# Patient Record
Sex: FEMALE | Race: WHITE | Employment: OTHER | URBAN - METROPOLITAN AREA
[De-identification: names, ages, dates, MRNs, and addresses within clinical notes are randomized per-mention and may not be internally consistent; named-entity substitution may affect disease eponyms.]

---

## 2017-09-05 PROBLEM — G89.29 CHRONIC BILATERAL LOW BACK PAIN WITHOUT SCIATICA: Status: ACTIVE | Noted: 2017-09-05

## 2017-09-05 PROBLEM — M54.50 CHRONIC BILATERAL LOW BACK PAIN WITHOUT SCIATICA: Status: ACTIVE | Noted: 2017-09-05

## 2018-05-18 PROBLEM — H26.491 OTHER SECONDARY CATARACT, RIGHT EYE: Status: ACTIVE | Noted: 2017-06-07

## 2018-07-18 PROBLEM — Z79.02 PLATELET INHIBITION DUE TO PLAVIX: Status: ACTIVE | Noted: 2018-07-18

## 2018-08-15 PROCEDURE — 88305 TISSUE EXAM BY PATHOLOGIST: CPT | Performed by: INTERNAL MEDICINE

## 2020-08-14 ENCOUNTER — HOSPITAL ENCOUNTER (OUTPATIENT)
Dept: MAMMOGRAPHY | Age: 75
Discharge: HOME | End: 2020-08-14
Attending: FAMILY MEDICINE
Payer: MEDICARE

## 2020-08-14 DIAGNOSIS — Z12.31 VISIT FOR SCREENING MAMMOGRAM: ICD-10-CM

## 2020-08-14 PROCEDURE — 77063 BREAST TOMOSYNTHESIS BI: CPT | Performed by: FAMILY MEDICINE

## 2020-08-14 PROCEDURE — 77067 SCR MAMMO BI INCL CAD: CPT | Performed by: FAMILY MEDICINE

## 2020-08-14 NOTE — PROGRESS NOTES
[employer] message sent to patient that mammogram results is normal and to follow up yearly for breast exam and repeat mammogram.      [phone] (home) [phone] (work)  Telephone Information:  Mobile          [phone]

## 2021-02-10 PROBLEM — L57.0 AK (ACTINIC KERATOSIS): Status: ACTIVE | Noted: 2021-02-10

## 2021-02-10 PROBLEM — C44.319 BASAL CELL CARCINOMA (BCC) OF LEFT FOREHEAD: Status: ACTIVE | Noted: 2021-02-10

## 2021-02-25 ENCOUNTER — APPOINTMENT (OUTPATIENT)
Dept: CT IMAGING | Facility: HOSPITAL | Age: 76
DRG: 065 | End: 2021-02-25
Attending: EMERGENCY MEDICINE
Payer: MEDICARE

## 2021-02-25 ENCOUNTER — HOSPITAL ENCOUNTER (INPATIENT)
Facility: HOSPITAL | Age: 76
LOS: 1 days | Discharge: HOME | DRG: 065 | End: 2021-02-27
Attending: EMERGENCY MEDICINE | Admitting: INTERNAL MEDICINE
Payer: MEDICARE

## 2021-02-25 ENCOUNTER — APPOINTMENT (OUTPATIENT)
Dept: GENERAL RADIOLOGY | Facility: HOSPITAL | Age: 76
DRG: 065 | End: 2021-02-25
Attending: EMERGENCY MEDICINE
Payer: MEDICARE

## 2021-02-25 DIAGNOSIS — R47.01 APHASIA: Primary | ICD-10-CM

## 2021-02-25 DIAGNOSIS — Z86.73 HISTORY OF CVA (CEREBROVASCULAR ACCIDENT) WITHOUT RESIDUAL DEFICITS: ICD-10-CM

## 2021-02-25 PROBLEM — R79.89 AZOTEMIA: Status: ACTIVE | Noted: 2021-02-25

## 2021-02-25 LAB
ALBUMIN SERPL-MCNC: 3.6 G/DL (ref 3.4–5)
ALBUMIN/GLOB SERPL: 1 {RATIO} (ref 1–2)
ALP LIVER SERPL-CCNC: 94 U/L
ALT SERPL-CCNC: 33 U/L
ANION GAP SERPL CALC-SCNC: 5 MMOL/L (ref 0–18)
AST SERPL-CCNC: 23 U/L (ref 15–37)
BASOPHILS # BLD AUTO: 0.02 X10(3) UL (ref 0–0.2)
BASOPHILS NFR BLD AUTO: 0.3 %
BILIRUB SERPL-MCNC: 0.3 MG/DL (ref 0.1–2)
BUN BLD-MCNC: 16 MG/DL (ref 7–18)
BUN/CREAT SERPL: 22.5 (ref 10–20)
CALCIUM BLD-MCNC: 9.3 MG/DL (ref 8.5–10.1)
CHLORIDE SERPL-SCNC: 107 MMOL/L (ref 98–112)
CHOLEST SMN-MCNC: 231 MG/DL (ref ?–200)
CO2 SERPL-SCNC: 26 MMOL/L (ref 21–32)
CREAT BLD-MCNC: 0.71 MG/DL
DEPRECATED RDW RBC AUTO: 49.6 FL (ref 35.1–46.3)
EOSINOPHIL # BLD AUTO: 0.02 X10(3) UL (ref 0–0.7)
EOSINOPHIL NFR BLD AUTO: 0.3 %
ERYTHROCYTE [DISTWIDTH] IN BLOOD BY AUTOMATED COUNT: 13.5 % (ref 11–15)
EST. AVERAGE GLUCOSE BLD GHB EST-MCNC: 111 MG/DL (ref 68–126)
GLOBULIN PLAS-MCNC: 3.6 G/DL (ref 2.8–4.4)
GLUCOSE BLD-MCNC: 88 MG/DL (ref 70–99)
GLUCOSE BLD-MCNC: 92 MG/DL (ref 70–99)
HBA1C MFR BLD HPLC: 5.5 % (ref ?–5.7)
HCT VFR BLD AUTO: 36.6 %
HDLC SERPL-MCNC: 133 MG/DL (ref 40–59)
HGB BLD-MCNC: 12.1 G/DL
IMM GRANULOCYTES # BLD AUTO: 0.01 X10(3) UL (ref 0–1)
IMM GRANULOCYTES NFR BLD: 0.1 %
LDLC SERPL CALC-MCNC: 87 MG/DL (ref ?–100)
LYMPHOCYTES # BLD AUTO: 2.16 X10(3) UL (ref 1–4)
LYMPHOCYTES NFR BLD AUTO: 28.4 %
M PROTEIN MFR SERPL ELPH: 7.2 G/DL (ref 6.4–8.2)
MCH RBC QN AUTO: 32.7 PG (ref 26–34)
MCHC RBC AUTO-ENTMCNC: 33.1 G/DL (ref 31–37)
MCV RBC AUTO: 98.9 FL
MONOCYTES # BLD AUTO: 0.82 X10(3) UL (ref 0.1–1)
MONOCYTES NFR BLD AUTO: 10.8 %
NEUTROPHILS # BLD AUTO: 4.57 X10 (3) UL (ref 1.5–7.7)
NEUTROPHILS # BLD AUTO: 4.57 X10(3) UL (ref 1.5–7.7)
NEUTROPHILS NFR BLD AUTO: 60.1 %
NONHDLC SERPL-MCNC: 98 MG/DL (ref ?–130)
NT-PROBNP SERPL-MCNC: 70 PG/ML (ref ?–450)
OSMOLALITY SERPL CALC.SUM OF ELEC: 287 MOSM/KG (ref 275–295)
PLATELET # BLD AUTO: 302 10(3)UL (ref 150–450)
POTASSIUM SERPL-SCNC: 4.1 MMOL/L (ref 3.5–5.1)
RBC # BLD AUTO: 3.7 X10(6)UL
SARS-COV-2 RNA RESP QL NAA+PROBE: NOT DETECTED
SODIUM SERPL-SCNC: 138 MMOL/L (ref 136–145)
TRIGL SERPL-MCNC: 56 MG/DL (ref 30–149)
TROPONIN I SERPL-MCNC: <0.045 NG/ML (ref ?–0.04)
VLDLC SERPL CALC-MCNC: 11 MG/DL (ref 0–30)
WBC # BLD AUTO: 7.6 X10(3) UL (ref 4–11)

## 2021-02-25 PROCEDURE — 70450 CT HEAD/BRAIN W/O DYE: CPT | Performed by: EMERGENCY MEDICINE

## 2021-02-25 PROCEDURE — 71045 X-RAY EXAM CHEST 1 VIEW: CPT | Performed by: EMERGENCY MEDICINE

## 2021-02-25 PROCEDURE — 70496 CT ANGIOGRAPHY HEAD: CPT | Performed by: EMERGENCY MEDICINE

## 2021-02-25 PROCEDURE — 70498 CT ANGIOGRAPHY NECK: CPT | Performed by: EMERGENCY MEDICINE

## 2021-02-25 RX ORDER — LISINOPRIL 10 MG/1
10 TABLET ORAL DAILY
COMMUNITY
End: 2021-03-26

## 2021-02-25 RX ORDER — CLOPIDOGREL BISULFATE 75 MG/1
75 TABLET ORAL DAILY
Status: DISCONTINUED | OUTPATIENT
Start: 2021-02-26 | End: 2021-02-27

## 2021-02-25 RX ORDER — LORAZEPAM 1 MG/1
1 TABLET ORAL NIGHTLY PRN
Status: DISCONTINUED | OUTPATIENT
Start: 2021-02-25 | End: 2021-02-27

## 2021-02-25 RX ORDER — ACETAMINOPHEN 325 MG/1
650 TABLET ORAL EVERY 4 HOURS PRN
Status: DISCONTINUED | OUTPATIENT
Start: 2021-02-25 | End: 2021-02-27

## 2021-02-25 RX ORDER — ACETAMINOPHEN 650 MG/1
650 SUPPOSITORY RECTAL EVERY 4 HOURS PRN
Status: DISCONTINUED | OUTPATIENT
Start: 2021-02-25 | End: 2021-02-27

## 2021-02-25 RX ORDER — ONDANSETRON 2 MG/ML
4 INJECTION INTRAMUSCULAR; INTRAVENOUS EVERY 6 HOURS PRN
Status: DISCONTINUED | OUTPATIENT
Start: 2021-02-25 | End: 2021-02-27

## 2021-02-25 RX ORDER — HEPARIN SODIUM 5000 [USP'U]/ML
5000 INJECTION, SOLUTION INTRAVENOUS; SUBCUTANEOUS EVERY 8 HOURS SCHEDULED
Status: DISCONTINUED | OUTPATIENT
Start: 2021-02-25 | End: 2021-02-27

## 2021-02-25 RX ORDER — SODIUM CHLORIDE 9 MG/ML
INJECTION, SOLUTION INTRAVENOUS CONTINUOUS
Status: DISCONTINUED | OUTPATIENT
Start: 2021-02-25 | End: 2021-02-27

## 2021-02-25 RX ORDER — LISINOPRIL 10 MG/1
10 TABLET ORAL DAILY
Status: DISCONTINUED | OUTPATIENT
Start: 2021-02-26 | End: 2021-02-27

## 2021-02-25 RX ORDER — LEVOTHYROXINE SODIUM 112 UG/1
112 TABLET ORAL
Status: DISCONTINUED | OUTPATIENT
Start: 2021-02-26 | End: 2021-02-27

## 2021-02-25 RX ORDER — ASPIRIN 325 MG
325 TABLET ORAL DAILY
Status: DISCONTINUED | OUTPATIENT
Start: 2021-02-25 | End: 2021-02-26

## 2021-02-25 RX ORDER — HYDRALAZINE HYDROCHLORIDE 20 MG/ML
10 INJECTION INTRAMUSCULAR; INTRAVENOUS EVERY 2 HOUR PRN
Status: DISCONTINUED | OUTPATIENT
Start: 2021-02-25 | End: 2021-02-27

## 2021-02-25 RX ORDER — METOCLOPRAMIDE HYDROCHLORIDE 5 MG/ML
10 INJECTION INTRAMUSCULAR; INTRAVENOUS EVERY 8 HOURS PRN
Status: DISCONTINUED | OUTPATIENT
Start: 2021-02-25 | End: 2021-02-27

## 2021-02-25 RX ORDER — ASPIRIN 300 MG
300 SUPPOSITORY, RECTAL RECTAL DAILY
Status: DISCONTINUED | OUTPATIENT
Start: 2021-02-25 | End: 2021-02-26

## 2021-02-25 RX ORDER — PRAVASTATIN SODIUM 40 MG
40 TABLET ORAL DAILY
Status: ON HOLD | COMMUNITY
End: 2021-02-27

## 2021-02-25 RX ORDER — ATORVASTATIN CALCIUM 40 MG/1
40 TABLET, FILM COATED ORAL NIGHTLY
Status: DISCONTINUED | OUTPATIENT
Start: 2021-02-25 | End: 2021-02-26

## 2021-02-25 RX ORDER — LABETALOL HYDROCHLORIDE 5 MG/ML
10 INJECTION, SOLUTION INTRAVENOUS EVERY 10 MIN PRN
Status: DISCONTINUED | OUTPATIENT
Start: 2021-02-25 | End: 2021-02-27

## 2021-02-25 RX ORDER — ACETAMINOPHEN 325 MG/1
650 TABLET ORAL EVERY 6 HOURS PRN
Status: DISCONTINUED | OUTPATIENT
Start: 2021-02-25 | End: 2021-02-25

## 2021-02-25 RX ORDER — ASPIRIN 81 MG/1
81 TABLET ORAL DAILY
COMMUNITY

## 2021-02-25 RX ORDER — MELATONIN
1000 DAILY
Status: DISCONTINUED | OUTPATIENT
Start: 2021-02-26 | End: 2021-02-27

## 2021-02-25 RX ORDER — DOCUSATE SODIUM 100 MG/1
100 CAPSULE, LIQUID FILLED ORAL 2 TIMES DAILY PRN
Status: DISCONTINUED | OUTPATIENT
Start: 2021-02-25 | End: 2021-02-27

## 2021-02-25 NOTE — ED INITIAL ASSESSMENT (HPI)
Pt rpts that her speech has been \"garbled\" since yesterday at noon. Rpts that she can't think of the words,  in triage to assist with explanation. Last known well was yesterday morning.

## 2021-02-26 ENCOUNTER — APPOINTMENT (OUTPATIENT)
Dept: MRI IMAGING | Facility: HOSPITAL | Age: 76
DRG: 065 | End: 2021-02-26
Attending: Other
Payer: MEDICARE

## 2021-02-26 ENCOUNTER — APPOINTMENT (OUTPATIENT)
Dept: CV DIAGNOSTICS | Facility: HOSPITAL | Age: 76
DRG: 065 | End: 2021-02-26
Attending: INTERNAL MEDICINE
Payer: MEDICARE

## 2021-02-26 PROBLEM — I63.81 BASAL GANGLIA INFARCTION (HCC): Status: ACTIVE | Noted: 2021-02-26

## 2021-02-26 PROBLEM — I63.9 BASAL GANGLIA INFARCTION (HCC): Status: ACTIVE | Noted: 2021-02-26

## 2021-02-26 LAB
ATRIAL RATE: 61 BPM
GLUCOSE BLD-MCNC: 112 MG/DL (ref 70–99)
GLUCOSE BLD-MCNC: 96 MG/DL (ref 70–99)
P AXIS: 39 DEGREES
P-R INTERVAL: 174 MS
Q-T INTERVAL: 386 MS
QRS DURATION: 74 MS
QTC CALCULATION (BEZET): 388 MS
R AXIS: -1 DEGREES
T AXIS: 43 DEGREES
VENTRICULAR RATE: 61 BPM

## 2021-02-26 PROCEDURE — 93306 TTE W/DOPPLER COMPLETE: CPT | Performed by: INTERNAL MEDICINE

## 2021-02-26 PROCEDURE — 99223 1ST HOSP IP/OBS HIGH 75: CPT | Performed by: OTHER

## 2021-02-26 PROCEDURE — 70551 MRI BRAIN STEM W/O DYE: CPT | Performed by: OTHER

## 2021-02-26 RX ORDER — ASPIRIN 81 MG/1
81 TABLET, CHEWABLE ORAL DAILY
Status: DISCONTINUED | OUTPATIENT
Start: 2021-02-27 | End: 2021-02-27

## 2021-02-26 RX ORDER — ATORVASTATIN CALCIUM 80 MG/1
80 TABLET, FILM COATED ORAL NIGHTLY
Status: DISCONTINUED | OUTPATIENT
Start: 2021-02-26 | End: 2021-02-27

## 2021-02-26 NOTE — CM/SW NOTE
Pt is a 68 yo female admitted for aphasia. Pt may have had a stroke - on CVA protocol. Pt has history of TIAs. She lives with her . PT to see. SW to follow for any dc needs.        02/26/21 1000   CM/SW Referral Data   Referral Source Physician

## 2021-02-26 NOTE — OCCUPATIONAL THERAPY NOTE
OCCUPATIONAL THERAPY                      Patient cleared for OT eval per neurology per RN. SBP parameters 100-180. Assessment initiated; UE strength symmetrical, ROM WNL.  Bilateral  strength 4+/5, , 5/5 bilateral shoulder and elbow flex/ex

## 2021-02-26 NOTE — PLAN OF CARE
Assumed care at 0700  Patient alert, oriented x4  No acute neurological changes  Echo completed  Speech eval completed  MRI completed  Per neurology, hold bp home meds and give labetolol if systolic bp > 202  Worked with PT/OT.  Will see again tomorrow  Sys

## 2021-02-26 NOTE — ED PROVIDER NOTES
Patient Seen in: [facility] Emergency Department      History   Patient presents with:  Stroke    Stated Complaint: expressive apahsia that started yesterday morning     HPI/Subjective:   HPI    This is a 53-year-old woman, history of previous TIA c Types: Cigarettes        Quit date: 2000        Years since quittin.1      Smokeless tobacco: Never Used    Alcohol use: Yes      Alcohol/week: 2.0 standard drinks      Types: 2 Glasses of wine per week    Drug use:  No             Review of System PEPTIDE - Normal   RAPID SARS-COV-2 BY PCR - Normal   CBC WITH DIFFERENTIAL WITH PLATELET    Narrative: The following orders were created for panel order CBC WITH DIFFERENTIAL WITH PLATELET.   Procedure                               Abnormality evidence of mild diffuse atrophy and white matter disease, consistent with chronic small vessel ischemic changes.    Dictated by (CST): Pan Mcneill DO on 2/25/2021 at 5:43 PM     Finalized by (CST): Pan Mcneill DO on 2/25/2021 at 5:48 PM       Xr Chest Present on Admission  Date Reviewed: 2/18/2021          ICD-10-CM Noted POA    Aphasia R47.01 2/25/2021 Unknown    Azotemia R79.89 2/25/2021 Yes

## 2021-02-26 NOTE — PROGRESS NOTES
[facility] Hospitalist Team  Progress Note      Agustin Begum  2/4/1945    Assessment/Plan:     #Expressive aphasia  #h/o TIA  -CT brain no acute findings, MRI brain is pending  -CTA with focal stenosis involving carotid arteries, severe on L and mod on R Sertraline HCl  50 mg Oral Daily   • Vitamin B-12  1,000 mcg Oral Daily     Continuous Infusions:   • sodium chloride 75 mL/hr at 02/26/21 0700     PRN: acetaminophen **OR** acetaminophen, Labetalol HCl, hydrALAzine HCl, docusate sodium, ondansetron HCl **

## 2021-02-26 NOTE — PHYSICAL THERAPY NOTE
PT orders received and chart reviewed. Eval attempted however, pt with /63 on LUE and 205/66 on RUE once sitting EOB. Pt lives in a 2 story house with her spouse, both are retired.  There are no steps to enter and there is a full flight of stairs up t

## 2021-02-26 NOTE — PLAN OF CARE
NURSING ADMISSION NOTE    Pt admitted from ED to room 7602 at 2035. Patient admitted via Cart. Oriented to room. Safety precautions initiated. Bed in low position. Call light in reach. Admission navigator completed.   Pt a/ox4, w/ expressive aph

## 2021-02-26 NOTE — SLP NOTE
ADULT SWALLOWING EVALUATION    ASSESSMENT    ASSESSMENT/OVERALL IMPRESSION:  Order received for bedside swallow evaluation per CVA protocol to r/o aspiration. Pt presented from home with word-finding difficulties/intermittent aphasia; r/o CVA. MRI pending. agreed to implement aspiration precautions and continue to assess diet tolerance at this time. Will complete video swallow study if any further signs of intolerance with meals or decline in CXR. Informed RN of results and recommendations.  Will continue to Consistency: Regular; Thin liquids  Dysphagia History: see above; pt denied  Imaging Results: CXR 2/25/21  CONCLUSION:  Exam is within normal limits. SUBJECTIVE       OBJECTIVE   ORAL MOTOR EXAMINATION  Dentition: Upper dentures; Lower dentures(pt reported; degrees 30 mins after meal with as needed assistance 95 % of the time across 2 sessions. In Progress                              FOLLOW UP  Treatment Plan/Recommendations: Dysphagia therapy; Aspiration precautions  Number of Visits to [address] [place] [address] Yue

## 2021-02-26 NOTE — CONSULTS
[facility]    Report of Consultation    Sadia Cox Patient Status:  Observation    1945 MRN [id number]   [facility] 7NE-A Attending Boyd Martinez, [address] [facility] Day # 0 PCP João Romo MD     Date of Admission:   difficulty.   Baseline modified Gissel scale-0    History:  Past Medical History:   Diagnosis Date   • Adrenal adenoma    • Cancer ([facility] [address]     skin cancer forehead   • Colon polyps    • Disorder of thyroid    • Diverticulitis of colon 9/2007   • Generalized a PRN **OR** Metoclopramide HCl (REGLAN) injection 10 mg, 10 mg, Intravenous, Q8H PRN  •  aspirin 300 MG rectal suppository 300 mg, 300 mg, Rectal, Daily **OR** aspirin tab 325 mg, 325 mg, Oral, Daily  •  Heparin Sodium (Porcine) 5000 UNIT/ML injection 5,000 02/25/2021    BUN 16 02/25/2021    CREATSERUM 0.71 02/25/2021    GLU 92 02/25/2021    CA 9.3 02/25/2021    ALKPHO 94 02/25/2021    ALT 33 02/25/2021    AST 23 02/25/2021    BILT 0.3 02/25/2021    ALB 3.6 02/25/2021    TP 7.2 02/25/2021       Imaging:    MR

## 2021-02-26 NOTE — ED NOTES
Pt going to room 7162. Reported to [facility] [employer]. Transport paged.  Pt and  aware of pt's admission and verbalizing understanding

## 2021-02-26 NOTE — H&P
[facility] Hospitalist Team  History and Physical       Assessment/Plan:     #Expressive aphasia  #h/o TIA  #Carotid artery stenosis  -CT brain no acute findings  -CTA with focal stenosis involving carotid arteries, severe on L and mod on R  -neuro on consult  -s N/A 8/15/2018    Performed by Ashley Damon MD at [facility]      partial right        ALL:    Fluorescein             RASH       •  lisinopril 10 MG Oral Tab, Take 10 mg by mouth daily.     •  Pravastatin Sodium 40 MG Oral Tab, Ta (-)urgency  MUSCULOSKELETAL:  (-) arthritis (-) muscle cramps  SKIN:  (-) rashes (-) hair loss  NEUROLOGIC:  (-) paresthesias (-) weakness (-) numbness  PSYCHIATRIC:  (-) disorder of thought or mood  ENDOCRINE:  (-) heat or cold intolerance (-) polyuria (- (61436)    Result Date: 2/25/2021  PROCEDURE:  CT BRAIN OR HEAD (07213)  COMPARISON:  ROBERT CT, BRAIN W/O CONTRAST, 3/05/2007, 2:23 PM.  INDICATIONS:  head pain or injury  TECHNIQUE:  Noncontrast CT scanning is performed through the brain.  Dose reductio normal limits.    Dictated by (CST): Saturnino Sanchez DO on 2/25/2021 at 5:23 PM     Finalized by (CST): Saturnino Sanchez DO on 2/25/2021 at 5:24 PM       Cta Brain + Cta Carotids (TQI=[id number])    Result Date: 2/25/2021  PROCEDURE:  CTA BRAIN + CTA CAROTIDS ( smooth tapering of basilar artery. There is no carotid or vertebral basilar dissection or focal high-grade stenosis. There is tortuosity of both proximal vertebral arteries. There is tapering of the internal carotid arteries to the termini.   There i

## 2021-02-26 NOTE — PHYSICAL THERAPY NOTE
PT orders received, chart reviewed. Pt currently has a MRI and Neuro consult pending. Will hold until these are completed and pt is cleared to participate in PT. RN aware.  CM

## 2021-02-26 NOTE — OCCUPATIONAL THERAPY NOTE
OCCUPATIONAL THERAPY                    OT order received, chart reviewed. Patient is scheduled for MRI for possible CVA and awaiting a neuro consult.    Will hold the initiation of OT services until neuro consult completed and patient cleared f

## 2021-02-27 VITALS
RESPIRATION RATE: 16 BRPM | WEIGHT: 142 LBS | TEMPERATURE: 99 F | HEART RATE: 60 BPM | BODY MASS INDEX: 26 KG/M2 | OXYGEN SATURATION: 96 % | DIASTOLIC BLOOD PRESSURE: 66 MMHG | SYSTOLIC BLOOD PRESSURE: 176 MMHG

## 2021-02-27 LAB
GLUCOSE BLD-MCNC: 91 MG/DL (ref 70–99)
GLUCOSE BLD-MCNC: 97 MG/DL (ref 70–99)

## 2021-02-27 PROCEDURE — 99233 SBSQ HOSP IP/OBS HIGH 50: CPT | Performed by: OTHER

## 2021-02-27 RX ORDER — ATORVASTATIN CALCIUM 80 MG/1
80 TABLET, FILM COATED ORAL NIGHTLY
Qty: 30 TABLET | Refills: 1 | Status: SHIPPED | OUTPATIENT
Start: 2021-02-27 | End: 2021-03-05

## 2021-02-27 NOTE — PLAN OF CARE
Assumed patient care at [facility] oriented x4  Tele; NSR. KERRI   Complex neuro exam per protocol; neuro deficits unchanged.   Still with expressive aphasia and blurry vision when reading up close  BP maintained within targeted parameters  Denies pain  Am

## 2021-02-27 NOTE — SLP NOTE
SPEECH DAILY NOTE - INPATIENT    ASSESSMENT & PLAN   ASSESSMENT  Pt seen for dysphagia tx to assess tolerance with recommended diet, ensure proper utilization of aspiration precautions and provide pt/family education.  Patient seen with recommended diet of and implementation of aspiration precautions and swallow strategies independently over 3 session(s).      In Progress   Goal #3 The patient will utilize compensatory strategies as outlined by  BSSE (clinical evaluation) including Slow rate, Small bites, Sma

## 2021-02-27 NOTE — CONSULTS
[facility] Cardiology Consultation    [address] Patient Status:  Inpatient    1945 MRN [id number]   [facility] 7NE-A Attending Shahida Beasley, [address] Day # 0 PCP Chelo Bray MD     Reason for Consultation:  CVA      History of • Vitamin B-12  1,000 mcg Oral Daily       Continuous Infusions:  • sodium chloride Stopped (02/26/21 4344)       Social History:   reports that she quit smoking about 21 years ago. Her smoking use included cigarettes.  She has a 20.00 pack-year smoking h Labs:   HEM:  Recent Labs   Lab 02/25/21  1639   WBC 7.6   HGB 12.1   .0       Chem:  Recent Labs   Lab 02/25/21  1639      K 4.1      CO2 26.0   BUN 16   CREATSERUM 0.71   CA 9.3   GLU 92       Recent Labs   Lab 02/25/21  1639   ALT

## 2021-02-27 NOTE — PLAN OF CARE
Assumed care of patient at 0700  A/Ox4, RA, NSR on tele  Q4 neuros, no new deficits  Cards on consult d/t shunt on echo. Recommending outpatient follow up.   PT rec home  SLP rec outpatient ST  Denies pain  Patient clear to be discharged from all standpoint oxygen as ordered  - Monitor patient for seizure activity, document and report duration and description of seizure to MD/LIP  - If seizure occurs, turn patient to side and suction secretions as needed  - Reorient patient post seizure  - Seizure pads on all

## 2021-02-27 NOTE — PHYSICAL THERAPY NOTE
PHYSICAL THERAPY EVALUATION - INPATIENT     Room Number: 4698/7208-G  Evaluation Date: 2/27/2021  Type of Evaluation: Initial  Physician Order: PT Eval and Treat    Presenting Problem: expressive aphasia  Reason for Therapy: Mobility Dysfunction and Stairs to Bedroom: 13  Railing: Yes    Lives With: Spouse  Drives: Yes  Patient Owned Equipment: None       Prior Level of [place]: Patient reported being independent gait without device, independent ADL/self-care, was able to drive    SUBJECTIVE  \ CJ    FUNCTIONAL ABILITY STATUS  Gait Assessment   Gait Assistance: Supervision(SBA-supervision)  Distance (ft): 130 x 2, 7 ft, 5 ft, 25 ft  Assistive Device: None  Pattern: (Decr luis/step length/heel-toe pattern, step-to)  Stoop/Curb Assistance: Not t impairments: impaired independence during bed mobility/transfers/gait, decreased gait speed vs baseline level. Functional outcome measures completed include AM-PAC 6 click and patient showed 26.97% impairment.   Based on this evaluation, patient's clinical

## 2021-02-27 NOTE — DISCHARGE SUMMARY
General Medicine Discharge Summary     Patient ID:  Kirill Leier  67year old  2/4/1945    Admit date: 2/25/2021    Discharge date and time: 2/27/21    Attending Physician: Maira Guevara, CHANGED    lisinopril 10 MG Oral Tab  Take 10 mg by mouth daily. aspirin 81 MG Oral Tab EC  Take 81 mg by mouth daily.     Betamethasone Dipropionate 0.05 % External Lotion  Apply topically to AA scalp BID x 2 weeks, then hold x 1-2 weeks, then restart P

## 2021-02-27 NOTE — PROGRESS NOTES
[address] [facility] Progress Note    Marquis Jean Patient Status:  Observation    1945 MRN [id number]   [facility] 7NE-A Attending Eugenie Ellis DO   Hosp Day # 0 PCP Neptali Larry MD       [address]  weakness/paresthesias, hearing loss, vertigo, tinnitus, hoarseness, dysphagia, or alternations in taste. The patient denies upper/lower extremity weakness or paresthesias.   The patient denies dizziness, imbalance, falls, difficulty with ambulation, coordi mg/dL 111     Component      Latest Ref Rng & Units 2/25/2021   Cholesterol, Total      <200 mg/dL 231 (H)   HDL Cholesterol      40 - 59 mg/dL 133 (H)   Triglycerides      30 - 149 mg/dL 56   LDL Cholesterol Calc      <100 mg/dL 87   VLDL      0 - 30 mg/d study was available for comparison.        Impression:  L BG infarction - likely small vessel disease related  ICAD, L > R  Old lacunar infarct  HTN  HLD    Plan:  Stroke order set in place   - CTH, CTA B+C, MRI Brain,echo with bubbles completed   - LDL 87,

## 2021-02-27 NOTE — PROGRESS NOTES
[facility] Hospitalist Team  Progress Note      Agustin Muñoz  2/4/1945    Assessment/Plan:     #Expressive aphasia 2/2 acute stroke  #h/o TIA  -CT brain no acute findings, MRI brain shows basal ganglia infarct  -CTA with focal stenosis involving carotid ar Oral Daily   • Levothyroxine Sodium  112 mcg Oral Before breakfast   • lisinopril  10 mg Oral Daily   • Sertraline HCl  50 mg Oral Daily   • Vitamin B-12  1,000 mcg Oral Daily     Continuous Infusions:   • sodium chloride Stopped (02/26/21 1503)     PRN: a

## 2021-03-01 NOTE — PAYOR COMM NOTE
--------------  DISCHARGE REVIEW    Payor: BCBS MEDICARE ADV PPO  Subscriber #:  [id number]  Authorization Number: [id number]    Observation order 2/25  Inpatient order 2/27  Discharge Date: 2/27/2021  3:54 PM     Admitting Physician: Sharif Joiner plavix, statin dose increased to atorva 80  -echo with small PFO, cards consulted, will follow up with them     #HTN- cont bp meds  #HL- cont statin  #Hypothyroidism- cont synthroid  #Depression/anxiety - cont ssri     Consults: IP CONSULT TO HOSPITALIST distress, alert and oriented x 3  Heart: RRR  Lungs: clear bilaterally, no active wheezing  Abdomen: nontender, nondistended, intact BS  Extremities: no pedal edema   Neuro: CN inact, some word finding difficulties    Total time coordinating care for disch Q8H Mallika 62   • Clopidogrel Bisulfate  75 mg Oral Daily   • Levothyroxine Sodium  112 mcg Oral Before breakfast   • lisinopril  10 mg Oral Daily   • Sertraline HCl  50 mg Oral Daily   • Vitamin B-12  1,000 mcg Oral Daily         Continuous Infusions:  • sodium Systolic pressure was at the upper limits of normal      to, at most, mildly increased; in the range of 30 mm Hg to 35 mm Hg.      Impressions:  No previous study was available for comparison.      Labs:   HEM:  Recent Labs   Lab 02/25/21  1639   WBC 7.6

## 2021-03-01 NOTE — PAYOR COMM NOTE
--------------  ADMISSION REVIEW     Payor: BCBS MEDICARE ADV PPO  Subscriber #:  [id number]  Authorization Number: [id number]    Observation order 2/25  Inpatient order 2/27      Admitting Physician: Jarett Leonard MD  Attending Physician:  Evelin tavarez accident)/no residual effects    • S/P colonoscopy with polypectomy 08/07/13    Dr Jerry Sullivan (rpt 5 yrs)   • Thyroid disease               Past Surgical History:   Procedure Laterality Date   • COLONOSCOPY  4/6/10    Colon polyp, next due 3 yrs   • COLONOSCOPY DIFFERENTIAL - Abnormal; Notable for the following components:    RBC 3.70 (*)     RDW-SD 49.6 (*)     All other components within normal limits   TROPONIN I - Normal   PRO BETA NATRIURETIC PEPTIDE - Normal   RAPID SARS-COV-2 BY PCR - Normal   CBC WITH DIF inflammation. SKULL:             No evidence for fracture or osseous abnormality. OTHER:             None. CONCLUSION:  1. No acute intracranial disease identified.  2. There is evidence of mild diffuse atrophy and white matter disease, consisten diagnosis)    Disposition:  Admit  2/25/2021  5:30 pm    Follow-up:  No follow-up provider specified.         Medications Prescribed:  Current Discharge Medication List                          Present on Admission  Date Reviewed: 2/18/2021          ICD-10- weakness, numbness, HA, dizziness, Cp, sob, n/v, diarrhea and fevers. Ambulation is fine. Vision might be blurry shes not sure.     Past Medical History:   Diagnosis Date   • Adrenal adenoma    • Colon polyps    • Diverticulitis of colon 9/2007   • Dylan Shoemaker capsule daily        Social History    Tobacco Use      Smoking status: Former Smoker        Packs/day: 1.00        Years: 20.00        Pack years: 20        Types: Cigarettes        Quit date: 2000        Years since quittin.1      Smokeless toba normal. No rashes or lesions. Neurologic: Moving all extremities spontaneously, no focal deficit appreciated. Facial asymmetry noted with bit of R droop but this is chronic per picture  showed me.      Data Review:    LABS:   Lab Results   Compone matter disease, consistent with chronic small vessel ischemic changes.    Dictated by (CST): Yaneth Murray DO on 2/25/2021 at 5:43 PM     Finalized by (CST): Yaneth Murray DO on 2/25/2021 at 5:48 PM       Xr Chest Ap Portable  (cpt=71045)    Result Date: 2/ Omnipaque 350  FINDINGS:  Thoracic aorta is partially imaged. There is tortuosity and ectasia. Ascending diameter 3.3 cm. Corresponding descending diameter 3.0 cm. No dissection. Typical take up pattern of the great vessels from the transverse arch. the right. 2. Mild carotid bifurcation atherosclerosis. 3. Patent anterior, middle and posterior cerebral arteries. No high-grade central stenosis. 4. Details as above. Continued clinical correlation recommended.      Dictated by (CST): Moose Truong MD on on Plavix at home and compliant with her medications and her blood pressure and hyperlipidemia is well controlled. Since her admission she states her speech is slightly improved and no longer having any word finding difficulty.   Baseline modified Kingsbury s injection 10 mg, 10 mg, Intravenous, Q10 Min PRN  •  hydrALAzine HCl (APRESOLINE) injection 10 mg, 10 mg, Intravenous, Q2H PRN  •  docusate sodium (COLACE) cap 100 mg, 100 mg, Oral, BID PRN  •  ondansetron HCl (ZOFRAN) injection 4 mg, 4 mg, Intravenous, Q6 extremity is within normal range  Finger-to-nose coordination is intact.   Gait deferred.     NIHSS- 2 ( 1-facial droop 1- dysarthria)     Diagnostic Data:         Lab Results   Component Value Date      02/25/2021     K 4.1 02/25/2021      02/2

## 2021-03-02 ENCOUNTER — TELEPHONE (OUTPATIENT)
Dept: PHYSICAL THERAPY | Facility: HOSPITAL | Age: 76
End: 2021-03-02

## 2021-03-02 ENCOUNTER — OFFICE VISIT (OUTPATIENT)
Dept: SPEECH THERAPY | Facility: HOSPITAL | Age: 76
End: 2021-03-02
Attending: INTERNAL MEDICINE
Payer: MEDICARE

## 2021-03-02 DIAGNOSIS — R47.01 APHASIA: ICD-10-CM

## 2021-03-02 DIAGNOSIS — Z86.73 HISTORY OF CVA (CEREBROVASCULAR ACCIDENT) WITHOUT RESIDUAL DEFICITS: ICD-10-CM

## 2021-03-02 PROCEDURE — 92523 SPEECH SOUND LANG COMPREHEN: CPT

## 2021-03-02 NOTE — PROGRESS NOTES
ADULT SPEECH/LANGUAGE EVALUATION:   Referring Physician: Dr. Ronen Leong (R47.01);  History of CVA (cerebrovascular accident) without residual deficits (Z19.80)     Date of Service: 3/2/2021   Speech-language evaluation completed per MD or impairment     macular degeneration     Current Medication per Chart:  •  atorvastatin 80 MG Oral Tab, Take 1 tablet (80 mg total) by mouth nightly., Disp: 30 tablet, Rfl: 1    •  lisinopril 10 MG Oral Tab, Take 10 mg by mouth daily. , Disp: , Rfl:     •  a Aphasia Quotient of 91.1 (normal=98.4). Patient’s speech is most consistent with anomic aphasia and is characterized by word-finding difficulty, circumlocutions, and pauses/hesitations.   Deficits adversely impact ability to communicate/function as indepen Comments:  Administered Western Aphasia Battery-Revised (WAB-R).   The Western Aphasia Battery-Revised (WAB-R) assesses the presence/severity of aphasia, as well as defines language strengths and deficits in patients.  The Aphasia Quotient (AQ) of the W productions given min verbal/visual cues and appropriate processing time to facilitate lexical retrieval (3 months).   STG 7: Patient will accurately complete 12/12 semantic judgements for sentences independently to facilitate auditory comprehension (3 sherlyn

## 2021-03-03 DIAGNOSIS — Z23 NEED FOR VACCINATION: ICD-10-CM

## 2021-03-04 ENCOUNTER — OFFICE VISIT (OUTPATIENT)
Dept: SPEECH THERAPY | Facility: HOSPITAL | Age: 76
End: 2021-03-04
Attending: INTERNAL MEDICINE
Payer: MEDICARE

## 2021-03-04 PROCEDURE — 92507 TX SP LANG VOICE COMM INDIV: CPT

## 2021-03-04 NOTE — PROGRESS NOTES
Treatment #2 (Medicare 2/10; POC thru 5/31/21)  Treatment Time: 60 minutes  Precautions: Fall Risk     Charges: 1 billed (19366)  Pain: 0/10      Diagnosis: Aphasia (R47.01);  History of CVA (cerebrovascular accident) without residual deficits (Z86.73)     features independently increasing to 5 given mod verbal cues for when and why.     STG 4: Patient will produce an average of 3/3 agents (doer) for stated verbs given min verbal/visual cues and appropriate processing time to facilitate lexical retrieval and

## 2021-03-08 ENCOUNTER — OFFICE VISIT (OUTPATIENT)
Dept: SPEECH THERAPY | Facility: HOSPITAL | Age: 76
End: 2021-03-08
Attending: INTERNAL MEDICINE
Payer: MEDICARE

## 2021-03-08 DIAGNOSIS — R47.01 APHASIA: ICD-10-CM

## 2021-03-08 DIAGNOSIS — Z86.73 HISTORY OF CVA (CEREBROVASCULAR ACCIDENT) WITHOUT RESIDUAL DEFICITS: ICD-10-CM

## 2021-03-08 PROCEDURE — 92507 TX SP LANG VOICE COMM INDIV: CPT

## 2021-03-08 NOTE — PROGRESS NOTES
Treatment #3 (Medicare 3/10; POC thru 5/31/21)  Treatment Time: 60 minutes  Precautions: Fall Risk     Charges: 1 billed (07868)  Pain: 0/10      Diagnosis: Aphasia (R47.01);  History of CVA (cerebrovascular accident) without residual deficits (Z86.73)     productions given min verbal/visual cues and appropriate processing time to facilitate lexical retrieval (3 months). Patient answered 3/3 Wh-questions independently.     STG 7: Patient will accurately complete 12/12 semantic judgements for sentences indepe

## 2021-03-09 ENCOUNTER — OFFICE VISIT (OUTPATIENT)
Dept: NEUROLOGY | Facility: CLINIC | Age: 76
End: 2021-03-09
Payer: MEDICARE

## 2021-03-09 VITALS — RESPIRATION RATE: 16 BRPM | DIASTOLIC BLOOD PRESSURE: 62 MMHG | SYSTOLIC BLOOD PRESSURE: 110 MMHG | HEART RATE: 60 BPM

## 2021-03-09 DIAGNOSIS — I63.9 BASAL GANGLIA INFARCTION (HCC): Primary | ICD-10-CM

## 2021-03-09 PROCEDURE — 3078F DIAST BP <80 MM HG: CPT | Performed by: PHYSICIAN ASSISTANT

## 2021-03-09 PROCEDURE — 3074F SYST BP LT 130 MM HG: CPT | Performed by: PHYSICIAN ASSISTANT

## 2021-03-09 PROCEDURE — 99212 OFFICE O/P EST SF 10 MIN: CPT | Performed by: PHYSICIAN ASSISTANT

## 2021-03-09 NOTE — PROGRESS NOTES
[address] [facility] Patient Status:  No patient class for patient encounter    1945 MRN [id number]   Location [address] [facility], [address], [address] [facility] Attending No att. providers found   [address] Day # 0 PCP Rafaela Dwyer COLONOSCOPY & POLYPECTOMY  08/07/2013    Dr Bogdan Chisholm, polyp, rpt 5 yrs   • COLONOSCOPY, POSSIBLE BIOPSY, POSSIBLE POLYPECTOMY [address] N/A 8/15/2018    Performed by Ad Arredondo MD at [facility]      partial right      No family history Vitamins-Minerals (ICAPS AREDS FORMULA) Oral Tab, 1 capsule daily, Disp: 30 Tab, Rfl: 12  •  Cholecalciferol (VITAMIN D OR), Take 1 capsule by mouth daily.   , Disp: , Rfl:   •  FISH OIL 1000 MG OR CAPS, 1 capsule daily, Disp: , Rfl:       Fluorescein

## 2021-03-10 ENCOUNTER — OFFICE VISIT (OUTPATIENT)
Dept: SPEECH THERAPY | Facility: HOSPITAL | Age: 76
End: 2021-03-10
Attending: INTERNAL MEDICINE
Payer: MEDICARE

## 2021-03-10 PROCEDURE — 92507 TX SP LANG VOICE COMM INDIV: CPT

## 2021-03-10 NOTE — PROGRESS NOTES
Treatment #4 (Medicare 3/10; POC thru 5/31/21)  Treatment Time: 60 minutes  Precautions: Fall Risk     Charges: 1 billed (89116)  Pain: 0/10      Diagnosis: Aphasia (R47.01);  History of CVA (cerebrovascular accident) without residual deficits (Z86.73)     time to facilitate lexical retrieval (3 months). Patient answered 3/3 Wh-questions independently. STG 7: Patient will accurately complete 12/12 semantic judgements for sentences independently to facilitate auditory comprehension (3 months).   Patient co

## 2021-03-15 ENCOUNTER — OFFICE VISIT (OUTPATIENT)
Dept: SPEECH THERAPY | Facility: HOSPITAL | Age: 76
End: 2021-03-15
Attending: INTERNAL MEDICINE
Payer: MEDICARE

## 2021-03-15 PROCEDURE — 92507 TX SP LANG VOICE COMM INDIV: CPT

## 2021-03-15 NOTE — PROGRESS NOTES
Treatment #5 (Medicare 5/10; POC thru 5/31/21)  Treatment Time: 60 minutes  Precautions: Fall Risk     Charges: 1 billed (22177)  Pain: 0/10      Diagnosis: Aphasia (R47.01);  History of CVA (cerebrovascular accident) without residual deficits (Z86.73)     3/3 Wh-questions for sentence productions given min verbal/visual cues and appropriate processing time to facilitate lexical retrieval (3 months). Not addressed due to focus on other goals.     STG 7: Patient will accurately complete 12/12 semantic judgeme

## 2021-03-18 ENCOUNTER — OFFICE VISIT (OUTPATIENT)
Dept: SPEECH THERAPY | Facility: HOSPITAL | Age: 76
End: 2021-03-18
Attending: INTERNAL MEDICINE
Payer: MEDICARE

## 2021-03-18 PROCEDURE — 92507 TX SP LANG VOICE COMM INDIV: CPT

## 2021-03-18 NOTE — PROGRESS NOTES
Treatment #6 (Medicare 6/10; POC thru 5/31/21)  Treatment Time: 60 minutes  Precautions: Fall Risk     Charges: 1 billed (84620)  Pain: 0/10      Diagnosis: Aphasia (R47.01);  History of CVA (cerebrovascular accident) without residual deficits (Z86.73)     productions given min verbal/visual cues and appropriate processing time to facilitate lexical retrieval (3 months). Patient answered 3/3 Wh-questions given min-mod verbal cues.     STG 7: Patient will accurately complete 12/12 semantic judgements for sent

## 2021-03-22 ENCOUNTER — TELEPHONE (OUTPATIENT)
Dept: PHYSICAL THERAPY | Age: 76
End: 2021-03-22

## 2021-03-22 ENCOUNTER — OFFICE VISIT (OUTPATIENT)
Dept: SPEECH THERAPY | Facility: HOSPITAL | Age: 76
End: 2021-03-22
Attending: INTERNAL MEDICINE
Payer: MEDICARE

## 2021-03-22 PROCEDURE — 92526 ORAL FUNCTION THERAPY: CPT

## 2021-03-22 NOTE — PROGRESS NOTES
Treatment #7 (Medicare 7/10; POC thru 5/31/21)  Treatment Time: 60 minutes  Precautions: Fall Risk     Charges: 1 billed (61947)  Pain: 0/10      Diagnosis: Aphasia (R47.01);  History of CVA (cerebrovascular accident) without residual deficits (Z86.73)     Patient will answer an average of 3/3 Wh-questions for sentence productions given min verbal/visual cues and appropriate processing time to facilitate lexical retrieval (3 months).   Patient answered 2/3 Wh-questions increasing to 3/3 given min verbal cues

## 2021-03-25 ENCOUNTER — OFFICE VISIT (OUTPATIENT)
Dept: SPEECH THERAPY | Facility: HOSPITAL | Age: 76
End: 2021-03-25
Attending: INTERNAL MEDICINE
Payer: MEDICARE

## 2021-03-25 PROCEDURE — 92507 TX SP LANG VOICE COMM INDIV: CPT

## 2021-03-25 NOTE — PROGRESS NOTES
Treatment #8 (Medicare 8/10; POC thru 5/31/21)  Treatment Time: 60 minutes  Precautions: Fall Risk     Charges: 1 billed (33262)  Pain: 0/10      Diagnosis: Aphasia (R47.01);  History of CVA (cerebrovascular accident) without residual deficits (Z86.73)     communication skills across 3-4 sessions. Discharge Status: GOAL MET. Discussed compensatory strategies related to word-finding (eg: Five Ws, word-finding notebook, feature description framework). Patient/ verbalized understanding.     STG 3Teresa Fossa [phone].     Sincerely,  Electronically signed by therapist: Chayo Tovar, SLP

## 2021-03-29 ENCOUNTER — APPOINTMENT (OUTPATIENT)
Dept: SPEECH THERAPY | Facility: HOSPITAL | Age: 76
End: 2021-03-29
Attending: INTERNAL MEDICINE
Payer: MEDICARE

## 2021-03-30 ENCOUNTER — APPOINTMENT (OUTPATIENT)
Dept: SPEECH THERAPY | Facility: HOSPITAL | Age: 76
End: 2021-03-30
Attending: INTERNAL MEDICINE
Payer: MEDICARE

## 2021-03-31 ENCOUNTER — OFFICE VISIT (OUTPATIENT)
Dept: NEUROLOGY | Facility: CLINIC | Age: 76
End: 2021-03-31
Payer: MEDICARE

## 2021-03-31 VITALS
HEART RATE: 68 BPM | RESPIRATION RATE: 16 BRPM | SYSTOLIC BLOOD PRESSURE: 104 MMHG | BODY MASS INDEX: 24 KG/M2 | WEIGHT: 134 LBS | DIASTOLIC BLOOD PRESSURE: 62 MMHG

## 2021-03-31 DIAGNOSIS — I63.9 BASAL GANGLIA INFARCTION (HCC): Primary | ICD-10-CM

## 2021-03-31 DIAGNOSIS — I67.2 INTRACRANIAL ATHEROSCLEROSIS: ICD-10-CM

## 2021-03-31 PROCEDURE — 99214 OFFICE O/P EST MOD 30 MIN: CPT | Performed by: OTHER

## 2021-03-31 PROCEDURE — 3078F DIAST BP <80 MM HG: CPT | Performed by: OTHER

## 2021-03-31 PROCEDURE — 3074F SYST BP LT 130 MM HG: CPT | Performed by: OTHER

## 2021-04-01 ENCOUNTER — HOSPITAL ENCOUNTER (EMERGENCY)
Facility: HOSPITAL | Age: 76
Discharge: HOME | End: 2021-04-01
Attending: EMERGENCY MEDICINE
Payer: MEDICARE

## 2021-04-01 VITALS
DIASTOLIC BLOOD PRESSURE: 56 MMHG | SYSTOLIC BLOOD PRESSURE: 174 MMHG | BODY MASS INDEX: 24 KG/M2 | OXYGEN SATURATION: 97 % | TEMPERATURE: 99 F | WEIGHT: 135 LBS | RESPIRATION RATE: 17 BRPM | HEART RATE: 65 BPM

## 2021-04-01 DIAGNOSIS — I10 ESSENTIAL HYPERTENSION: Primary | ICD-10-CM

## 2021-04-01 PROCEDURE — 99282 EMERGENCY DEPT VISIT SF MDM: CPT

## 2021-04-01 NOTE — PROGRESS NOTES
HPI:    Patient ID: Arnie Romo is a 67year old female. Primary care: Dr Richi Dawn    HPI  Arnie Romo is a 67year old female who presented for stroke follow up.  She has a history of hypertension, hyperlipidemia, former smoker, previous TIA/ Brother       Social History    Tobacco Use      Smoking status: Former Smoker        Packs/day: 1.00        Years: 20.00        Pack years: 20        Types: Cigarettes        Quit date: 2012        Years since quittin.2      Smokeless tobacco: Nev • Clopidogrel Bisulfate 75 MG Oral Tab Take 1 tablet (75 mg total) by mouth once daily. 90 tablet 3   • LORazepam 1 MG Oral Tab Take 1 tablet (1 mg total) by mouth nightly as needed.  90 tablet 1   • Vitamin B-12 1000 MCG Oral Tab Take 1 tablet by mouth d acute infarct is seen within the left basal ganglia. 2. Moderate chronic small vessel ischemic disease with punctate chronic infarcts within the basal ganglia and thalami. CTA head and neck: 2/25/2021  1.  There is focal stenosis involving the distal is the standard of care in her case    Continue dual antiplatelet therapy atleast for 3 months   Continue Lipitor dose to 80 mg daily  Continue current antihypertensives        Stroke Rehabilitation: home therapy  Again  educated on secondary stroke preven

## 2021-04-01 NOTE — ED INITIAL ASSESSMENT (HPI)
Pt here for high bp of 198/95 at 1715 tonight.  Pt denies any n/t/weakness, ha, vision changes, or slurred speech

## 2021-04-02 NOTE — ED PROVIDER NOTES
Patient Seen in: [facility] Emergency Department      History   Patient presents with:  Hypertension    Stated Complaint: /90 at home.  Denies headache, blurry vision, N/T    HPI/Subjective:   HPI    This is a pleasant 14-year-old female who pr Packs/day: 1.00        Years: 20.00        Pack years: 20        Types: Cigarettes        Quit date: 2012        Years since quittin.2      Smokeless tobacco: Never Used    Vaping Use      Vaping Use: Never used    Alcohol use:  Yes      Alcohol/wee well and nontoxic. She is asymptomatic. Her blood pressures come down to 347 systolic and she is still asymptomatic. She will follow-up as an outpatient with her primary care physician. Talked about adjustments to medications.   Return if she develops a

## 2021-04-05 ENCOUNTER — APPOINTMENT (OUTPATIENT)
Dept: SPEECH THERAPY | Facility: HOSPITAL | Age: 76
End: 2021-04-05
Attending: INTERNAL MEDICINE
Payer: MEDICARE

## 2021-04-05 PROBLEM — H35.3212 EXUDATIVE AGE-RELATED MACULAR DEGENERATION, RIGHT EYE, WITH INACTIVE CHOROIDAL NEOVASCULARIZATION (HCC): Status: ACTIVE | Noted: 2021-04-05

## 2021-04-12 ENCOUNTER — APPOINTMENT (OUTPATIENT)
Dept: SPEECH THERAPY | Facility: HOSPITAL | Age: 76
End: 2021-04-12
Attending: INTERNAL MEDICINE
Payer: MEDICARE

## 2021-04-19 ENCOUNTER — APPOINTMENT (OUTPATIENT)
Dept: SPEECH THERAPY | Facility: HOSPITAL | Age: 76
End: 2021-04-19
Attending: INTERNAL MEDICINE
Payer: MEDICARE

## 2021-04-26 ENCOUNTER — APPOINTMENT (OUTPATIENT)
Dept: SPEECH THERAPY | Facility: HOSPITAL | Age: 76
End: 2021-04-26
Attending: INTERNAL MEDICINE
Payer: MEDICARE

## 2021-05-03 ENCOUNTER — APPOINTMENT (OUTPATIENT)
Dept: SPEECH THERAPY | Facility: HOSPITAL | Age: 76
End: 2021-05-03
Attending: INTERNAL MEDICINE
Payer: MEDICARE

## 2021-05-10 ENCOUNTER — APPOINTMENT (OUTPATIENT)
Dept: SPEECH THERAPY | Facility: HOSPITAL | Age: 76
End: 2021-05-10
Attending: INTERNAL MEDICINE
Payer: MEDICARE

## 2021-05-17 ENCOUNTER — APPOINTMENT (OUTPATIENT)
Dept: SPEECH THERAPY | Facility: HOSPITAL | Age: 76
End: 2021-05-17
Attending: INTERNAL MEDICINE
Payer: MEDICARE

## 2021-05-23 PROBLEM — I63.9 APHASIA DUE TO ACUTE CEREBROVASCULAR ACCIDENT (CVA) (HCC): Status: ACTIVE | Noted: 2021-02-25

## 2021-05-23 PROBLEM — G31.9 CEREBRAL ATROPHY (HCC): Status: ACTIVE | Noted: 2021-05-23

## 2021-05-23 PROBLEM — R79.89 AZOTEMIA: Status: RESOLVED | Noted: 2021-02-25 | Resolved: 2021-05-23

## 2021-05-23 PROBLEM — I63.9 APHASIA DUE TO ACUTE CEREBROVASCULAR ACCIDENT (CVA): Status: ACTIVE | Noted: 2021-02-25

## 2021-05-23 PROBLEM — R47.01 APHASIA DUE TO ACUTE CEREBROVASCULAR ACCIDENT (CVA) (HCC): Status: ACTIVE | Noted: 2021-02-25

## 2021-05-23 PROBLEM — G31.9 CEREBRAL ATROPHY: Status: ACTIVE | Noted: 2021-05-23

## 2021-05-23 PROBLEM — R47.01 APHASIA DUE TO ACUTE CEREBROVASCULAR ACCIDENT (CVA): Status: ACTIVE | Noted: 2021-02-25

## 2021-05-24 ENCOUNTER — APPOINTMENT (OUTPATIENT)
Dept: SPEECH THERAPY | Facility: HOSPITAL | Age: 76
End: 2021-05-24
Attending: INTERNAL MEDICINE
Payer: MEDICARE

## 2021-05-28 PROBLEM — I77.819 ECTASIS AORTA: Status: ACTIVE | Noted: 2021-05-28

## 2021-05-28 PROBLEM — I77.819 ECTASIS AORTA (HCC): Status: ACTIVE | Noted: 2021-05-28

## 2021-06-30 ENCOUNTER — OFFICE VISIT (OUTPATIENT)
Dept: NEUROLOGY | Facility: CLINIC | Age: 76
End: 2021-06-30
Payer: MEDICARE

## 2021-06-30 VITALS
RESPIRATION RATE: 16 BRPM | SYSTOLIC BLOOD PRESSURE: 122 MMHG | WEIGHT: 129.38 LBS | DIASTOLIC BLOOD PRESSURE: 60 MMHG | BODY MASS INDEX: 23 KG/M2 | HEART RATE: 62 BPM

## 2021-06-30 DIAGNOSIS — I63.9 BASAL GANGLIA INFARCTION (HCC): Primary | ICD-10-CM

## 2021-06-30 DIAGNOSIS — I67.2 INTRACRANIAL ATHEROSCLEROSIS: ICD-10-CM

## 2021-06-30 PROCEDURE — 3074F SYST BP LT 130 MM HG: CPT | Performed by: OTHER

## 2021-06-30 PROCEDURE — 99213 OFFICE O/P EST LOW 20 MIN: CPT | Performed by: OTHER

## 2021-06-30 PROCEDURE — 3078F DIAST BP <80 MM HG: CPT | Performed by: OTHER

## 2021-06-30 NOTE — PROGRESS NOTES
HPI:    Patient ID: Esperanza Cordoba is a 67year old female. HPI    Kirill Frey is a 67year old female who presented for stroke follow up. She is doing well and denies any new complaints.  States intermittently noticed word finding difficulty 08/07/2013    Dr Estrella Moran, polyp, rpt 5 yrs   • OOPHORECTOMY      partial right      Family History   Problem Relation Age of Onset   • Circulatory Problems Brother    • Stroke Brother       Social History    Tobacco Use      Smoking status: Former Smoker then restart PRN 60 mL 2   • Levothyroxine Sodium (SYNTHROID) 112 MCG Oral Tab Take 1 tablet (112 mcg total) by mouth before breakfast. BRAND ONLY 90 tablet 3   • Clopidogrel Bisulfate 75 MG Oral Tab Take 1 tablet (75 mg total) by mouth once daily.  90 tabl on the left and moderate on the right. 2. Mild carotid bifurcation atherosclerosis. 3. Patent anterior, middle and posterior cerebral arteries.  No high-grade central stenosis. ECHO: 2/26/2021  Left ventricle:  The cavity size was normal. Wall thi

## 2022-01-19 PROBLEM — D69.2 PURPURA (HCC): Status: ACTIVE | Noted: 2022-01-19

## 2022-01-19 PROBLEM — D69.2 PURPURA: Status: ACTIVE | Noted: 2022-01-19

## 2022-03-06 PROBLEM — C44.319 BASAL CELL CARCINOMA (BCC) OF LEFT FOREHEAD: Status: RESOLVED | Noted: 2021-02-10 | Resolved: 2022-03-06

## 2022-03-06 PROBLEM — Z85.828 HISTORY OF BASAL CELL CANCER: Status: ACTIVE | Noted: 2022-03-06

## 2022-03-09 PROBLEM — F33.40 RECURRENT MAJOR DEPRESSIVE DISORDER, IN REMISSION (HCC): Status: ACTIVE | Noted: 2022-03-09

## 2022-03-09 PROBLEM — I63.9 APHASIA DUE TO ACUTE CEREBROVASCULAR ACCIDENT (CVA) (HCC): Status: RESOLVED | Noted: 2021-02-25 | Resolved: 2022-03-09

## 2022-03-09 PROBLEM — R47.01 APHASIA DUE TO ACUTE CEREBROVASCULAR ACCIDENT (CVA) (HCC): Status: RESOLVED | Noted: 2021-02-25 | Resolved: 2022-03-09

## 2022-03-09 PROBLEM — R47.01 APHASIA DUE TO ACUTE CEREBROVASCULAR ACCIDENT (CVA): Status: RESOLVED | Noted: 2021-02-25 | Resolved: 2022-03-09

## 2022-03-09 PROBLEM — I63.81 BASAL GANGLIA INFARCTION (HCC): Status: RESOLVED | Noted: 2021-02-26 | Resolved: 2022-03-09

## 2022-03-09 PROBLEM — I69.30 HISTORY OF CEREBROVASCULAR ACCIDENT (CVA) WITH RESIDUAL DEFICIT: Status: ACTIVE | Noted: 2022-03-09

## 2022-03-09 PROBLEM — I63.9 APHASIA DUE TO ACUTE CEREBROVASCULAR ACCIDENT (CVA): Status: RESOLVED | Noted: 2021-02-25 | Resolved: 2022-03-09

## 2022-03-09 PROBLEM — F33.40 RECURRENT MAJOR DEPRESSIVE DISORDER, IN REMISSION: Status: ACTIVE | Noted: 2022-03-09

## 2022-03-09 PROBLEM — M81.0 AGE-RELATED OSTEOPOROSIS WITHOUT CURRENT PATHOLOGICAL FRACTURE: Status: ACTIVE | Noted: 2022-03-09

## 2023-08-09 ENCOUNTER — ORDER TRANSCRIPTION (OUTPATIENT)
Dept: ADMINISTRATIVE | Facility: HOSPITAL | Age: 78
End: 2023-08-09

## 2023-08-09 DIAGNOSIS — Z13.9 ENCOUNTER FOR SCREENING: Primary | ICD-10-CM

## 2023-10-13 ENCOUNTER — HOSPITAL ENCOUNTER (OUTPATIENT)
Dept: ULTRASOUND IMAGING | Age: 78
Discharge: HOME | End: 2023-10-13
Attending: FAMILY MEDICINE

## 2023-10-13 DIAGNOSIS — Z13.9 ENCOUNTER FOR SCREENING: ICD-10-CM

## 2024-10-12 ENCOUNTER — APPOINTMENT (OUTPATIENT)
Dept: CT IMAGING | Age: 79
End: 2024-10-12
Attending: EMERGENCY MEDICINE
Payer: MEDICARE

## 2024-10-12 ENCOUNTER — HOSPITAL ENCOUNTER (EMERGENCY)
Age: 79
Discharge: HOME | End: 2024-10-12
Attending: EMERGENCY MEDICINE
Payer: MEDICARE

## 2024-10-12 VITALS
DIASTOLIC BLOOD PRESSURE: 87 MMHG | HEART RATE: 79 BPM | TEMPERATURE: 99 F | BODY MASS INDEX: 21.85 KG/M2 | WEIGHT: 128 LBS | RESPIRATION RATE: 16 BRPM | HEIGHT: 64 IN | SYSTOLIC BLOOD PRESSURE: 122 MMHG | OXYGEN SATURATION: 97 %

## 2024-10-12 DIAGNOSIS — T07.XXXA ABRASIONS OF MULTIPLE SITES: ICD-10-CM

## 2024-10-12 DIAGNOSIS — S00.83XA TRAUMATIC HEMATOMA OF FOREHEAD, INITIAL ENCOUNTER: ICD-10-CM

## 2024-10-12 DIAGNOSIS — S09.90XA CLOSED HEAD INJURY, INITIAL ENCOUNTER: Primary | ICD-10-CM

## 2024-10-12 PROCEDURE — 99284 EMERGENCY DEPT VISIT MOD MDM: CPT

## 2024-10-12 PROCEDURE — 70450 CT HEAD/BRAIN W/O DYE: CPT | Performed by: EMERGENCY MEDICINE

## 2024-10-12 NOTE — DISCHARGE INSTRUCTIONS
Wash area with soap and water followed by Neosporin or bacitracin 2-3 times a day.  It is okay to cover.  You can shower, bathe.  The blood will track down with gravity over the next several days and can expect to have bruising around the eyes and face.  Return if headaches, vomiting, new complaints.

## 2024-10-12 NOTE — ED PROVIDER NOTES
Patient Seen in: [facility] In [place]      History     Chief Complaint   Patient presents with    Fall    Head Injury     Stated Complaint: Pt with trip and fall while walking dog - head injury on blood thinners.    Subjective:   HPI      Patient is a very pleasant 79-year-old female presents after a trip and fall.  Tripped over her dog.  Fell onto concrete.  She has a right frontal scalp hematoma with abrasions.  No loss consciousness.  No neck pain.  Here with her  helps with the history.  She is on aspirin and Plavix.  Patient is otherwise at her medical baseline.    Objective:     Past Medical History:    Adrenal adenoma    Aphasia due to acute cerebrovascular accident (CVA) (HCC)    MRI brain 5/2021:   18 mm linear acute infarct is seen within the left basal ganglia.  Residual aphasia     Basal ganglia infarction (HCC)    Cancer (HCC)    skin cancer forehead    Colon polyps    Disorder of thyroid    Diverticulitis of colon    Generalized anxiety disorder    HYPERLIPIDEMIA    HYPERTENSION    HYPOTHYROIDISM    MENOPAUSE    OSTEOPOROSIS    Personal history CVA (cerebrovascular accident)/no residual effects    S/P colonoscopy with polypectomy    Dr Hollis (rpt 5 yrs)    Stroke (HCC)    TIA 20 years    Thyroid disease    Visual impairment    macular degeneration              Past Surgical History:   Procedure Laterality Date    Colonoscopy  4/6/10    Colon polyp, next due 3 yrs    Colonoscopy N/A 8/15/2018    Procedure: COLONOSCOPY, POSSIBLE BIOPSY, POSSIBLE POLYPECTOMY 50518;  Surgeon: Bashir Ring MD;  Location: [facility]    Colonoscopy & polypectomy  08/07/2013    Dr Hollis, polyp, rpt 5 yrs    Oophorectomy      partial right                Social History     Socioeconomic History    Marital status:    Tobacco Use    Smoking status: Former     Current packs/day: 0.00     Average packs/day: 1 pack/day for 20.0 years (20.0 ttl pk-yrs)     Types: Cigarettes     Start date:  1992     Quit date: 2012     Years since quittin.7    Smokeless tobacco: Never   Vaping Use    Vaping status: Never Used   Substance and Sexual Activity    Alcohol use: Yes     Alcohol/week: 2.0 standard drinks of alcohol     Types: 2 Glasses of wine per week     Comment: socially    Drug use: No    Sexual activity: Never     Partners: Male   Other Topics Concern    Caffeine Concern Yes     Comment: 3 sevings/day    Exercise Yes     Comment: walking     Social Drivers of Health      [facility]    Housing Stability                  Physical Exam     ED Triage Vitals [10/12/24 1433]   /87   Pulse 79   Resp 16   Temp 99.3 °F (37.4 °C)   Temp src Temporal   SpO2 97 %   O2 Device None (Room air)       Current Vitals:   Vital Signs  BP: 122/87  Pulse: 79  Resp: 16  Temp: 99.3 °F (37.4 °C)  Temp src: Temporal    Oxygen Therapy  SpO2: 97 %  O2 Device: None (Room air)        Physical Exam  General: Well-appearing patient of stated age resting comfortably  HEENT: Normocephalic.  There is abrasion and hematoma with bleeding in the right frontal forehead.  No midline cervical spine tenderness.  Cervical spine cleared clinically.  Nonicteric sclera.  Moist mucous membranes  Lungs: No tachypnea  Cardiac: No tachycardia  Skin: No rashes, pallor  Neuro: No focal deficits.  At baseline.  Nonfocal  Extremities: No extremity tenderness or injuries.    ED Course   Labs Reviewed - No data to display         Head CT: I personally reviewed the films and my independent interpertaion showed no intracranial hemorrhage  Official report reviewed and negative.  Subcutaneous hematoma over the frontal scalp.     MDM      Patient presents after mechanical fall.  Hit her head on concrete.  No loss of consciousness.  Does have scalp hematoma with abrasion.  A sepsis performed.  Patient says she is up-to-date of her tetanus.  Will proceed to CT scan to rule out no  cerebral hemorrhage, subdural, epidural.  A sepsis.  Will reevaluate to see if any suturing, Dermabond needed.  Wound care instructions given.        Medical Decision Making      Disposition and Plan     Clinical Impression:  1. Closed head injury, initial encounter    2. Traumatic hematoma of forehead, initial encounter    3. Abrasions of multiple sites         Disposition:  Discharge  10/12/2024  3:20 pm    Follow-up:  Wei Michaels MD  [address] [place] [id number] [phone]    Follow up in 1 week(s)            Medications Prescribed:  Discharge Medication List as of 10/12/2024  3:22 PM              Supplementary Documentation:

## 2024-10-23 ENCOUNTER — OFFICE VISIT (OUTPATIENT)
Dept: FAMILY MEDICINE CLINIC | Facility: CLINIC | Age: 79
End: 2024-10-23
Payer: MEDICARE

## 2024-10-23 VITALS
BODY MASS INDEX: 23 KG/M2 | OXYGEN SATURATION: 99 % | DIASTOLIC BLOOD PRESSURE: 72 MMHG | HEART RATE: 68 BPM | SYSTOLIC BLOOD PRESSURE: 110 MMHG | WEIGHT: 133 LBS

## 2024-10-23 DIAGNOSIS — G47.9 DIFFICULTY SLEEPING: Primary | ICD-10-CM

## 2024-10-23 PROCEDURE — 3074F SYST BP LT 130 MM HG: CPT | Performed by: PHYSICIAN ASSISTANT

## 2024-10-23 PROCEDURE — 99203 OFFICE O/P NEW LOW 30 MIN: CPT | Performed by: PHYSICIAN ASSISTANT

## 2024-10-23 PROCEDURE — 1159F MED LIST DOCD IN RCRD: CPT | Performed by: PHYSICIAN ASSISTANT

## 2024-10-23 PROCEDURE — 3078F DIAST BP <80 MM HG: CPT | Performed by: PHYSICIAN ASSISTANT

## 2024-10-23 PROCEDURE — 1160F RVW MEDS BY RX/DR IN RCRD: CPT | Performed by: PHYSICIAN ASSISTANT

## 2024-10-23 RX ORDER — TRAZODONE HYDROCHLORIDE 50 MG/1
50 TABLET, FILM COATED ORAL NIGHTLY
Qty: 30 TABLET | Refills: 0 | Status: SHIPPED | OUTPATIENT
Start: 2024-10-23

## 2024-10-23 NOTE — PROGRESS NOTES
Subjective:   Patient ID: Agustin Araya is a 79 year old female.    HPI  Patient who is new to practice presents to discuss h/o difficulty sleeping for one year  Worse in the last few months  No particular trigger for this she can identify  She goes to bed around 10 pm, reads for a while and turns off the lights around 1130 pm, still awake at 3am  Was given lorazepam for a few years but did not like how it made her feel - she would be cloudy and forgetful, stopped taking 6 months ago  She takes 1/2 tablet once a month and falls asleep immediately  Has also tried melatonin, hot milk but not helping  She has pretty good sleep hygiene also  States her previous PCP only ever suggested otc sleep aids      History/Other:   Review of Systems   Constitutional:  Negative for chills, diaphoresis and fever.   Eyes:  Negative for visual disturbance.   Respiratory:  Negative for chest tightness and shortness of breath.    Cardiovascular:  Negative for chest pain, palpitations and leg swelling.   Neurological:  Negative for dizziness, light-headedness and headaches.   Psychiatric/Behavioral:  Positive for sleep disturbance.      Current Outpatient Medications   Medication Sig Dispense Refill    clopidogrel 75 MG Oral Tab Take 1 tablet (75 mg total) by mouth daily. 90 tablet 3    AMLODIPINE 5 MG Oral Tab TAKE ONE TABLET BY MOUTH ONCE DAILY 90 tablet 2    alendronate 70 MG Oral Tab Take 1 tablet (70 mg total) by mouth once a week. 12 tablet 3    FLUoxetine HCl 20 MG Oral Tab Take 1 tablet (20 mg total) by mouth daily. 90 tablet 1    hydrALAZINE 10 MG Oral Tab One tablet at noon and two tablets at 6 pm scheduled.  (only take one in the evening if you have a glass of wine) 270 tablet 2    lisinopril 10 MG Oral Tab Take 1 tablet (10 mg total) by mouth 2 (two) times a day. 180 tablet 1    levothyroxine (SYNTHROID) 112 MCG Oral Tab Take 1 tablet (112 mcg total) by mouth before breakfast. BRAND ONLY 90 tablet 3    ATORVASTATIN 80 MG  Oral Tab TAKE ONE TABLET BY MOUTH NIGHTLY 90 tablet 1    aspirin 81 MG Oral Tab EC Take 81 mg by mouth daily.      Vitamin B-12 1000 MCG Oral Tab Take 1 tablet by mouth daily 30 tablet 11    Multiple Vitamins-Minerals (ICAPS AREDS FORMULA) Oral Tab 1 capsule daily 30 Tab 12    Cholecalciferol (VITAMIN D OR) Take 1 capsule by mouth daily.        FISH OIL 1000 MG OR CAPS 1 capsule daily       Allergies:Allergies[1]    Objective:   Physical Exam  Vitals and nursing note reviewed.   Constitutional:       Appearance: She is well-developed.   HENT:      Head: Normocephalic and atraumatic.   Eyes:      Conjunctiva/sclera: Conjunctivae normal.      Pupils: Pupils are equal, round, and reactive to light.   Cardiovascular:      Rate and Rhythm: Normal rate and regular rhythm.      Heart sounds: Normal heart sounds.   Pulmonary:      Effort: Pulmonary effort is normal.      Breath sounds: Normal breath sounds. No wheezing or rales.   Musculoskeletal:      Cervical back: Normal range of motion and neck supple.   Neurological:      Mental Status: She is alert.         Assessment & Plan:   1. Difficulty sleeping  Given low dose trazodone to trial. Stop lorazepam. Medication use and side effects discussed. Follow up after using several times to reassess condition. Otherwise will be due for physical in March 2025.  - traZODone 50 MG Oral Tab; Take 1 tablet (50 mg total) by mouth nightly.  Dispense: 30 tablet; Refill: 0             [1]   Allergies  Allergen Reactions    Fluorescein RASH

## 2024-12-01 DIAGNOSIS — G47.9 DIFFICULTY SLEEPING: ICD-10-CM

## 2024-12-02 NOTE — TELEPHONE ENCOUNTER
A refill request was received for:  Requested Prescriptions     Pending Prescriptions Disp Refills    traZODone 50 MG Oral Tab 30 tablet 0     Sig: Take 1 tablet (50 mg total) by mouth nightly.       Last refill date:10/23/24       Last office visit:10/23/24       No future appointments.

## 2024-12-03 RX ORDER — TRAZODONE HYDROCHLORIDE 50 MG/1
50 TABLET, FILM COATED ORAL NIGHTLY
Qty: 30 TABLET | Refills: 0 | Status: SHIPPED | OUTPATIENT
Start: 2024-12-03

## 2025-01-02 DIAGNOSIS — G47.9 DIFFICULTY SLEEPING: ICD-10-CM

## 2025-01-02 RX ORDER — TRAZODONE HYDROCHLORIDE 50 MG/1
50 TABLET, FILM COATED ORAL NIGHTLY
Qty: 30 TABLET | Refills: 0 | Status: SHIPPED | OUTPATIENT
Start: 2025-01-02

## 2025-01-02 NOTE — TELEPHONE ENCOUNTER
A refill request was received for:  Requested Prescriptions     Pending Prescriptions Disp Refills    TRAZODONE 50 MG Oral Tab [Pharmacy Med Name: Trazodone Hydrochloride 50 Mg Tab Zydu] 30 tablet 0     Sig: TAKE ONE TABLET BY MOUTH ONCE NIGHTLY       Last refill date:12/3/2024       Last office visit:10/3/2024     Follow up due:  No future appointments.

## 2025-02-02 DIAGNOSIS — G47.9 DIFFICULTY SLEEPING: ICD-10-CM

## 2025-02-03 RX ORDER — TRAZODONE HYDROCHLORIDE 50 MG/1
50 TABLET, FILM COATED ORAL NIGHTLY
Qty: 30 TABLET | Refills: 0 | Status: SHIPPED | OUTPATIENT
Start: 2025-02-03

## 2025-03-11 DIAGNOSIS — G47.9 DIFFICULTY SLEEPING: ICD-10-CM

## 2025-03-11 RX ORDER — TRAZODONE HYDROCHLORIDE 50 MG/1
50 TABLET ORAL NIGHTLY
Qty: 30 TABLET | Refills: 0 | Status: SHIPPED | OUTPATIENT
Start: 2025-03-11

## 2025-03-11 NOTE — TELEPHONE ENCOUNTER
A refill request was received for:  Requested Prescriptions     Pending Prescriptions Disp Refills    TRAZODONE 50 MG Oral Tab [Pharmacy Med Name: TRAZODONE 50MG TABLETS] 30 tablet 0     Sig: TAKE 1 TABLET BY MOUTH EVERY NIGHT AT BEDTIME       Last refill date:   2/3/25    Last office visit: 10/23/24    Follow up due:  Future Appointments   Date Time Provider Department Center   4/16/2025 10:00 AM Saniya Martinez PA-C [facility]

## 2025-04-02 ENCOUNTER — PATIENT MESSAGE (OUTPATIENT)
Dept: FAMILY MEDICINE CLINIC | Facility: CLINIC | Age: 80
End: 2025-04-02

## 2025-04-02 DIAGNOSIS — I10 ESSENTIAL HYPERTENSION, BENIGN: Primary | ICD-10-CM

## 2025-04-02 DIAGNOSIS — Z86.39 H/O VITAMIN D DEFICIENCY: ICD-10-CM

## 2025-04-02 DIAGNOSIS — E03.9 ACQUIRED HYPOTHYROIDISM: ICD-10-CM

## 2025-04-02 DIAGNOSIS — Z00.00 ROUTINE GENERAL MEDICAL EXAMINATION AT A HEALTH CARE FACILITY: ICD-10-CM

## 2025-04-08 DIAGNOSIS — G47.9 DIFFICULTY SLEEPING: ICD-10-CM

## 2025-04-08 RX ORDER — TRAZODONE HYDROCHLORIDE 50 MG/1
50 TABLET ORAL NIGHTLY
Qty: 30 TABLET | Refills: 0 | Status: SHIPPED | OUTPATIENT
Start: 2025-04-08

## 2025-04-08 NOTE — TELEPHONE ENCOUNTER
A refill request was received for:  Requested Prescriptions     Pending Prescriptions Disp Refills    TRAZODONE 50 MG Oral Tab [Pharmacy Med Name: Trazodone Hydrochloride 50 Mg Tab Zydu] 30 tablet 0     Sig: TAKE ONE TABLET BY MOUTH ONCE NIGHTLY       Last refill date: 3/2025      Last office visit: 10/2024    Follow up due:  Future Appointments   Date Time Provider Department Center   4/16/2025 10:00 AM Saniya Martinez PA-C [facility]

## 2025-04-14 ENCOUNTER — LAB ENCOUNTER (OUTPATIENT)
Dept: LAB | Age: 80
End: 2025-04-14
Attending: PHYSICIAN ASSISTANT
Payer: MEDICARE

## 2025-04-14 DIAGNOSIS — I10 ESSENTIAL HYPERTENSION, BENIGN: ICD-10-CM

## 2025-04-14 DIAGNOSIS — Z86.39 H/O VITAMIN D DEFICIENCY: ICD-10-CM

## 2025-04-14 DIAGNOSIS — E03.9 ACQUIRED HYPOTHYROIDISM: ICD-10-CM

## 2025-04-14 DIAGNOSIS — Z00.00 ROUTINE GENERAL MEDICAL EXAMINATION AT A HEALTH CARE FACILITY: ICD-10-CM

## 2025-04-14 LAB
ALBUMIN SERPL-MCNC: 4.3 G/DL (ref 3.2–4.8)
ALBUMIN/GLOB SERPL: 1.8 {RATIO} (ref 1–2)
ALP LIVER SERPL-CCNC: 80 U/L (ref 55–142)
ALT SERPL-CCNC: 41 U/L (ref 10–49)
ANION GAP SERPL CALC-SCNC: 4 MMOL/L (ref 0–18)
AST SERPL-CCNC: 48 U/L (ref ?–34)
BASOPHILS # BLD AUTO: 0.02 X10(3) UL (ref 0–0.2)
BASOPHILS NFR BLD AUTO: 0.4 %
BILIRUB SERPL-MCNC: 0.5 MG/DL (ref 0.2–1.1)
BUN BLD-MCNC: 14 MG/DL (ref 9–23)
CALCIUM BLD-MCNC: 9.5 MG/DL (ref 8.7–10.6)
CHLORIDE SERPL-SCNC: 106 MMOL/L (ref 98–112)
CHOLEST SERPL-MCNC: 177 MG/DL (ref ?–200)
CO2 SERPL-SCNC: 29 MMOL/L (ref 21–32)
CREAT BLD-MCNC: 0.71 MG/DL (ref 0.55–1.02)
EGFRCR SERPLBLD CKD-EPI 2021: 85 ML/MIN/1.73M2 (ref 60–?)
EOSINOPHIL # BLD AUTO: 0.14 X10(3) UL (ref 0–0.7)
EOSINOPHIL NFR BLD AUTO: 2.9 %
ERYTHROCYTE [DISTWIDTH] IN BLOOD BY AUTOMATED COUNT: 14.3 %
FASTING PATIENT LIPID ANSWER: YES
FASTING STATUS PATIENT QL REPORTED: YES
GLOBULIN PLAS-MCNC: 2.4 G/DL (ref 2–3.5)
GLUCOSE BLD-MCNC: 92 MG/DL (ref 70–99)
HCT VFR BLD AUTO: 36.1 % (ref 35–48)
HDLC SERPL-MCNC: 113 MG/DL (ref 40–59)
HGB BLD-MCNC: 11.8 G/DL (ref 12–16)
IMM GRANULOCYTES # BLD AUTO: 0.05 X10(3) UL (ref 0–1)
IMM GRANULOCYTES NFR BLD: 1 %
LDLC SERPL CALC-MCNC: 54 MG/DL (ref ?–100)
LYMPHOCYTES # BLD AUTO: 1.42 X10(3) UL (ref 1–4)
LYMPHOCYTES NFR BLD AUTO: 29.5 %
MCH RBC QN AUTO: 33.6 PG (ref 26–34)
MCHC RBC AUTO-ENTMCNC: 32.7 G/DL (ref 31–37)
MCV RBC AUTO: 102.8 FL (ref 80–100)
MONOCYTES # BLD AUTO: 0.65 X10(3) UL (ref 0.1–1)
MONOCYTES NFR BLD AUTO: 13.5 %
NEUTROPHILS # BLD AUTO: 2.53 X10 (3) UL (ref 1.5–7.7)
NEUTROPHILS # BLD AUTO: 2.53 X10(3) UL (ref 1.5–7.7)
NEUTROPHILS NFR BLD AUTO: 52.7 %
NONHDLC SERPL-MCNC: 64 MG/DL (ref ?–130)
OSMOLALITY SERPL CALC.SUM OF ELEC: 288 MOSM/KG (ref 275–295)
PLATELET # BLD AUTO: 290 10(3)UL (ref 150–450)
POTASSIUM SERPL-SCNC: 4.2 MMOL/L (ref 3.5–5.1)
PROT SERPL-MCNC: 6.7 G/DL (ref 5.7–8.2)
RBC # BLD AUTO: 3.51 X10(6)UL (ref 3.8–5.3)
SODIUM SERPL-SCNC: 139 MMOL/L (ref 136–145)
TRIGL SERPL-MCNC: 48 MG/DL (ref 30–149)
TSI SER-ACNC: 2.87 UIU/ML (ref 0.55–4.78)
VIT B12 SERPL-MCNC: 620 PG/ML (ref 211–911)
VIT D+METAB SERPL-MCNC: 82 NG/ML (ref 30–100)
VLDLC SERPL CALC-MCNC: 7 MG/DL (ref 0–30)
WBC # BLD AUTO: 4.8 X10(3) UL (ref 4–11)

## 2025-04-14 PROCEDURE — 84443 ASSAY THYROID STIM HORMONE: CPT

## 2025-04-14 PROCEDURE — 80061 LIPID PANEL: CPT

## 2025-04-14 PROCEDURE — 36415 COLL VENOUS BLD VENIPUNCTURE: CPT

## 2025-04-14 PROCEDURE — 85025 COMPLETE CBC W/AUTO DIFF WBC: CPT

## 2025-04-14 PROCEDURE — 80053 COMPREHEN METABOLIC PANEL: CPT

## 2025-04-14 PROCEDURE — 82607 VITAMIN B-12: CPT

## 2025-04-14 PROCEDURE — 82306 VITAMIN D 25 HYDROXY: CPT

## 2025-04-16 ENCOUNTER — OFFICE VISIT (OUTPATIENT)
Dept: FAMILY MEDICINE CLINIC | Facility: CLINIC | Age: 80
End: 2025-04-16
Payer: MEDICARE

## 2025-04-16 ENCOUNTER — TELEPHONE (OUTPATIENT)
Dept: FAMILY MEDICINE CLINIC | Facility: CLINIC | Age: 80
End: 2025-04-16

## 2025-04-16 VITALS
OXYGEN SATURATION: 96 % | BODY MASS INDEX: 22.2 KG/M2 | SYSTOLIC BLOOD PRESSURE: 130 MMHG | HEIGHT: 64 IN | WEIGHT: 130 LBS | RESPIRATION RATE: 14 BRPM | DIASTOLIC BLOOD PRESSURE: 60 MMHG | HEART RATE: 66 BPM

## 2025-04-16 DIAGNOSIS — F33.40 RECURRENT MAJOR DEPRESSIVE DISORDER, IN REMISSION: ICD-10-CM

## 2025-04-16 DIAGNOSIS — M54.50 CHRONIC BILATERAL LOW BACK PAIN WITHOUT SCIATICA: ICD-10-CM

## 2025-04-16 DIAGNOSIS — E78.00 HYPERCHOLESTEREMIA: ICD-10-CM

## 2025-04-16 DIAGNOSIS — I77.819 ECTASIS AORTA: ICD-10-CM

## 2025-04-16 DIAGNOSIS — I69.30 HISTORY OF CEREBROVASCULAR ACCIDENT (CVA) WITH RESIDUAL DEFICIT: ICD-10-CM

## 2025-04-16 DIAGNOSIS — F41.1 GENERALIZED ANXIETY DISORDER: ICD-10-CM

## 2025-04-16 DIAGNOSIS — E03.9 ACQUIRED HYPOTHYROIDISM: ICD-10-CM

## 2025-04-16 DIAGNOSIS — J30.1 NON-SEASONAL ALLERGIC RHINITIS DUE TO POLLEN: ICD-10-CM

## 2025-04-16 DIAGNOSIS — M81.0 AGE-RELATED OSTEOPOROSIS WITHOUT CURRENT PATHOLOGICAL FRACTURE: ICD-10-CM

## 2025-04-16 DIAGNOSIS — Z12.11 COLON CANCER SCREENING: ICD-10-CM

## 2025-04-16 DIAGNOSIS — I63.81 BASAL GANGLIA INFARCTION (HCC): ICD-10-CM

## 2025-04-16 DIAGNOSIS — L57.0 AK (ACTINIC KERATOSIS): ICD-10-CM

## 2025-04-16 DIAGNOSIS — G89.29 CHRONIC BILATERAL LOW BACK PAIN WITHOUT SCIATICA: ICD-10-CM

## 2025-04-16 DIAGNOSIS — Z00.00 ENCOUNTER FOR ANNUAL HEALTH EXAMINATION: Primary | ICD-10-CM

## 2025-04-16 DIAGNOSIS — M17.11 OSTEOARTHROSIS, LOCALIZED, PRIMARY, KNEE, RIGHT: ICD-10-CM

## 2025-04-16 DIAGNOSIS — R07.81 RIB PAIN ON LEFT SIDE: ICD-10-CM

## 2025-04-16 DIAGNOSIS — G47.9 DIFFICULTY SLEEPING: ICD-10-CM

## 2025-04-16 DIAGNOSIS — H26.491 OTHER SECONDARY CATARACT, RIGHT EYE: ICD-10-CM

## 2025-04-16 DIAGNOSIS — G31.9 CEREBRAL ATROPHY: ICD-10-CM

## 2025-04-16 DIAGNOSIS — D69.2 PURPURA: ICD-10-CM

## 2025-04-16 DIAGNOSIS — I10 ESSENTIAL HYPERTENSION, BENIGN: ICD-10-CM

## 2025-04-16 DIAGNOSIS — H35.3212 EXUDATIVE AGE-RELATED MACULAR DEGENERATION, RIGHT EYE, WITH INACTIVE CHOROIDAL NEOVASCULARIZATION (HCC): ICD-10-CM

## 2025-04-16 DIAGNOSIS — Z85.828 HISTORY OF BASAL CELL CANCER: ICD-10-CM

## 2025-04-16 DIAGNOSIS — L65.9 ALOPECIA: ICD-10-CM

## 2025-04-16 DIAGNOSIS — Z12.31 VISIT FOR SCREENING MAMMOGRAM: ICD-10-CM

## 2025-04-16 PROBLEM — K90.49 GASTROINTESTINAL INTOLERANCE OF ORAL BISPHOSPHONATE THERAPY: Status: ACTIVE | Noted: 2022-11-11

## 2025-04-16 PROBLEM — T45.8X5A GASTROINTESTINAL INTOLERANCE OF ORAL BISPHOSPHONATE THERAPY: Status: ACTIVE | Noted: 2022-11-11

## 2025-04-16 PROBLEM — T45.8X5A GASTROINTESTINAL INTOLERANCE OF ORAL BISPHOSPHONATE THERAPY: Status: RESOLVED | Noted: 2022-11-11 | Resolved: 2025-04-16

## 2025-04-16 PROBLEM — K90.49 GASTROINTESTINAL INTOLERANCE OF ORAL BISPHOSPHONATE THERAPY: Status: RESOLVED | Noted: 2022-11-11 | Resolved: 2025-04-16

## 2025-04-16 PROBLEM — Z79.02 PLATELET INHIBITION DUE TO PLAVIX: Status: RESOLVED | Noted: 2018-07-18 | Resolved: 2025-04-16

## 2025-04-16 PROCEDURE — 96160 PT-FOCUSED HLTH RISK ASSMT: CPT | Performed by: PHYSICIAN ASSISTANT

## 2025-04-16 PROCEDURE — 1170F FXNL STATUS ASSESSED: CPT | Performed by: PHYSICIAN ASSISTANT

## 2025-04-16 PROCEDURE — 3075F SYST BP GE 130 - 139MM HG: CPT | Performed by: PHYSICIAN ASSISTANT

## 2025-04-16 PROCEDURE — 1160F RVW MEDS BY RX/DR IN RCRD: CPT | Performed by: PHYSICIAN ASSISTANT

## 2025-04-16 PROCEDURE — 3008F BODY MASS INDEX DOCD: CPT | Performed by: PHYSICIAN ASSISTANT

## 2025-04-16 PROCEDURE — 99499 UNLISTED E&M SERVICE: CPT | Performed by: PHYSICIAN ASSISTANT

## 2025-04-16 PROCEDURE — G0439 PPPS, SUBSEQ VISIT: HCPCS | Performed by: PHYSICIAN ASSISTANT

## 2025-04-16 PROCEDURE — 1159F MED LIST DOCD IN RCRD: CPT | Performed by: PHYSICIAN ASSISTANT

## 2025-04-16 PROCEDURE — 3078F DIAST BP <80 MM HG: CPT | Performed by: PHYSICIAN ASSISTANT

## 2025-04-16 PROCEDURE — 1126F AMNT PAIN NOTED NONE PRSNT: CPT | Performed by: PHYSICIAN ASSISTANT

## 2025-04-16 RX ORDER — TRAZODONE HYDROCHLORIDE 50 MG/1
50 TABLET ORAL NIGHTLY PRN
Qty: 90 TABLET | Refills: 1 | Status: SHIPPED | OUTPATIENT
Start: 2025-04-16

## 2025-04-16 RX ORDER — FLUOXETINE 20 MG/1
20 TABLET, FILM COATED ORAL DAILY
Qty: 90 TABLET | Refills: 1 | Status: SHIPPED | OUTPATIENT
Start: 2025-04-16

## 2025-04-16 RX ORDER — ATORVASTATIN CALCIUM 80 MG/1
80 TABLET, FILM COATED ORAL NIGHTLY
Qty: 90 TABLET | Refills: 1 | Status: SHIPPED | OUTPATIENT
Start: 2025-04-16

## 2025-04-16 RX ORDER — AMLODIPINE BESYLATE 5 MG/1
5 TABLET ORAL DAILY
Qty: 90 TABLET | Refills: 1 | Status: SHIPPED | OUTPATIENT
Start: 2025-04-16

## 2025-04-16 RX ORDER — CLOPIDOGREL BISULFATE 75 MG/1
75 TABLET ORAL DAILY
Qty: 90 TABLET | Refills: 1 | Status: SHIPPED | OUTPATIENT
Start: 2025-04-16

## 2025-04-16 RX ORDER — ALENDRONATE SODIUM 70 MG/1
70 TABLET ORAL WEEKLY
Qty: 12 TABLET | Refills: 1 | Status: SHIPPED | OUTPATIENT
Start: 2025-04-16

## 2025-04-16 RX ORDER — LISINOPRIL 10 MG/1
10 TABLET ORAL 2 TIMES DAILY
Qty: 180 TABLET | Refills: 1 | Status: SHIPPED | OUTPATIENT
Start: 2025-04-16 | End: 2025-10-13

## 2025-04-16 RX ORDER — LEVOTHYROXINE SODIUM 112 MCG
112 TABLET ORAL
Qty: 90 TABLET | Refills: 1 | Status: SHIPPED | OUTPATIENT
Start: 2025-04-16

## 2025-04-16 NOTE — PROGRESS NOTES
Subjective:   Agustin Araya is a 80 year old female who presents for a MA AHA (Medicare Advantage Annual Health Assessment) and Subsequent Annual Wellness visit (Pt already had Initial Annual Wellness) and scheduled follow up of multiple significant but stable problems.   History of Present Illness  Agustin Araya is an 80-year-old female who presents for a Medicare annual super visit.    She has concerns about her slightly elevated AST level of 48, with the normal cutoff being 34. She does not take Tylenol or aspirin and consumes alcohol only on weekends, with one to three glasses over the weekend. She has been on alendronate for osteoporosis for two to three years and questions if it affects liver enzymes.    She experiences intermittent discomfort on her left side, which she attributes to arthritis. The discomfort is not daily and seems to be associated with movement, such as turning in bed. No breast tissue involvement or tenderness in the ribs or back is noted.    She has noticed hair loss over the last three years, prompting her to start wearing a headband six months ago. The hair loss is localized, and she is concerned it might be alopecia.    She has a history of stroke and is currently taking clopidogrel but is unsure about aspirin use. She inquires about the causes of stroke and is concerned about maintaining her health to prevent another occurrence.    She reports excellent sleep quality since starting trazodone, which she takes regularly. She wants to reduce the dose but is concerned about potential side effects.    She maintains a good diet, exercises regularly by walking five miles a day, and stretches often. She does not smoke and reports excellent sleep quality.    She has macular degeneration and sees her eye doctor every three months for shots in the eye.      History/Other:   Fall Risk Assessment:   She has been screened for Falls and is low risk.      Cognitive Assessment:   She had a  completely normal cognitive assessment - see flowsheet entries       Functional Ability/Status:   Agustin Araya has some abnormal functions as listed below:  She has Dressing and/or Bathing issues based on screening of functional status.  Difficulty dressing or bathing?: No  Bathing or Showering: Need some help  Dressing: Able without help  She has Driving difficulties based on screening of functional status. She has Hearing problems based on screening of functional status.      Depression Screening (PHQ):  PHQ-2 SCORE: 0  , done 4/16/2025             Advanced Directives:   She does have a Living Will but we do NOT have it on file in Epic.    She does have a POA but we do NOT have it on file in Epic.    Discussed Advance Care Planning with patient (and family/surrogate if present). Standard forms made available to patient in After Visit Summary.      Patient Active Problem List   Diagnosis    Acquired hypothyroidism    Essential hypertension, benign    Non-seasonal allergic rhinitis due to pollen    Osteoarthrosis, localized, primary, knee, right    Generalized anxiety disorder    Chronic bilateral low back pain without sciatica    Other secondary cataract, right eye    AK (actinic keratosis)    Exudative age-related macular degeneration, right eye, with inactive choroidal neovascularization (HCC)    Cerebral atrophy    Ectasis aorta    Purpura    History of basal cell cancer    Age-related osteoporosis without current pathological fracture    Recurrent major depressive disorder, in remission     Allergies:  She is allergic to fluorescein.    Current Medications:  Outpatient Medications Marked as Taking for the 4/16/25 encounter (Office Visit) with Saniya Martinez PA-C   Medication Sig    traZODone 50 MG Oral Tab Take 1 tablet (50 mg total) by mouth nightly as needed for Sleep.    SYNTHROID 112 MCG Oral Tab Take 1 tablet (112 mcg total) by mouth before breakfast. BRAND ONLY    lisinopril 10 MG Oral Tab Take 1  tablet (10 mg total) by mouth in the morning and 1 tablet (10 mg total) before bedtime.    alendronate 70 MG Oral Tab Take 1 tablet (70 mg total) by mouth once a week.    amLODIPine 5 MG Oral Tab Take 1 tablet (5 mg total) by mouth daily.    atorvastatin 80 MG Oral Tab Take 1 tablet (80 mg total) by mouth nightly.    clopidogrel 75 MG Oral Tab Take 1 tablet (75 mg total) by mouth daily.    FLUoxetine HCl 20 MG Oral Tab Take 1 tablet (20 mg total) by mouth daily.    Vitamin B-12 1000 MCG Oral Tab Take 1 tablet by mouth daily    Multiple Vitamins-Minerals (ICAPS AREDS FORMULA) Oral Tab 1 capsule daily    Cholecalciferol (VITAMIN D OR) Take 1 capsule by mouth daily.      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  Surgical History:  She  has a past surgical history that includes oophorectomy; colonoscopy (4/6/10); colonoscopy & polypectomy (08/07/2013); and colonoscopy (N/A, 8/15/2018).   Family History:  Her family history includes Circulatory Problems in her brother; Stroke in her brother.  Social History:  She  reports that she quit smoking about 13 years ago. Her smoking use included cigarettes. She started smoking about 33 years ago. She has a 20 pack-year smoking history. She has never used smokeless tobacco. She reports current alcohol use of about 2.0 standard drinks of alcohol per week. She reports that she does not use drugs.    Tobacco:  She smoked tobacco in  the past but quit greater than 12 months ago.  Tobacco Use[1]     CAGE Alcohol Screen:   CAGE screening score of 0 on 4/16/2025, showing low risk of alcohol abuse.      Patient Care Team:  Wei Michaels MD as PCP - General (Family Practice)  Mitzy Pearce APN (Nurse Practitioner)  Neyda Dodge, SLP (Speech Therapist)  Samira Jensen MD (NEUROLOGY)    Review of Systems   Constitutional:  Negative for chills, fatigue and fever.   HENT:  Negative for congestion, ear pain, rhinorrhea and sore throat.    Eyes:  Negative for visual disturbance.   Respiratory:  Negative for cough, shortness of breath and wheezing.    Cardiovascular:  Positive for chest pain (left ribs). Negative for palpitations and leg swelling.   Gastrointestinal:  Negative for abdominal pain, diarrhea, nausea and vomiting.   Genitourinary:  Negative for dysuria, frequency and hematuria.   Musculoskeletal:  Negative for arthralgias, gait problem and myalgias.   Skin:  Negative for rash.        Hair loss   Neurological:  Negative for weakness, light-headedness and headaches.   Hematological:  Negative for adenopathy.   Psychiatric/Behavioral:  Negative for dysphoric mood. The patient is not nervous/anxious.           Objective:   Physical Exam  Vitals and nursing note reviewed.   Constitutional:       General: She is not in acute distress.     Appearance: Normal appearance. She is well-developed.   HENT:      Head: Normocephalic and atraumatic.      Right Ear: Tympanic membrane, ear canal and external ear normal.      Left Ear: Tympanic membrane, ear canal and external ear normal.      Nose: Nose normal.      Mouth/Throat:      Mouth: Mucous membranes are moist.   Eyes:      Extraocular Movements: Extraocular movements intact.      Conjunctiva/sclera: Conjunctivae normal.      Pupils: Pupils are equal, round, and reactive to light.   Neck:      Thyroid: No thyromegaly.   Cardiovascular:      Rate and Rhythm: Normal rate and regular rhythm.       Pulses: Normal pulses.      Heart sounds: Normal heart sounds.   Pulmonary:      Effort: Pulmonary effort is normal.      Breath sounds: Normal breath sounds. No wheezing or rales.   Chest:      Chest wall: Tenderness (TTP palpation of left ribs along mid axillary line) present.   Abdominal:      General: Bowel sounds are normal. There is no distension.      Palpations: Abdomen is soft. There is no mass.      Tenderness: There is no abdominal tenderness.   Musculoskeletal:         General: No tenderness. Normal range of motion.      Cervical back: Normal range of motion and neck supple.   Lymphadenopathy:      Cervical: No cervical adenopathy.   Skin:     General: Skin is warm and dry.      Findings: No rash.      Comments: Complete loss of hair along the front hair line and receding back about 1/4 of the scalp.   Neurological:      General: No focal deficit present.      Mental Status: She is alert and oriented to person, place, and time.   Psychiatric:         Mood and Affect: Mood normal.         Behavior: Behavior normal.            /60   Pulse 66   Resp 14   Ht 5' 4\" (1.626 m)   Wt 130 lb (59 kg)   SpO2 96%   BMI 22.31 kg/m²  Estimated body mass index is 22.31 kg/m² as calculated from the following:    Height as of this encounter: 5' 4\" (1.626 m).    Weight as of this encounter: 130 lb (59 kg).    Medicare Hearing Assessment:   Hearing Screening    Screening Method: Whisper Test  Whisper Test Result: Pass         Visual Acuity:   Right Eye Visual Acuity: Uncorrected Right Eye Chart Acuity: 20/100   Left Eye Visual Acuity: Uncorrected Left Eye Chart Acuity: 20/100   Both Eyes Visual Acuity: Uncorrected Both Eyes Chart Acuity: 20/100   Able To Tolerate Visual Acuity: No        Assessment & Plan:   Agustin Araya is a 80 year old female who presents for a Medicare Assessment.     1. Encounter for annual health examination  Patient doing well overall. Labs reviewed.  Health maintenance issues  discussed. Encouraged routine vaccines.  Advised healthy diet and regular exercise.  Annual physicals.    2. Essential hypertension, benign  Controlled. Cpm. Refills given. Follow up in 6 months.   - lisinopril 10 MG Oral Tab; Take 1 tablet (10 mg total) by mouth in the morning and 1 tablet (10 mg total) before bedtime.  Dispense: 180 tablet; Refill: 1  - amLODIPine 5 MG Oral Tab; Take 1 tablet (5 mg total) by mouth daily.  Dispense: 90 tablet; Refill: 1    3. History of cerebrovascular accident (CVA) with residual deficit  Doing well. Continue present medications and optimization of risk factors.   - clopidogrel 75 MG Oral Tab; Take 1 tablet (75 mg total) by mouth daily.  Dispense: 90 tablet; Refill: 1    5. Ectasis aorta  Asymptomatic. Continue medications and healthy lifestyle. Optimize risk factors.     6. Cerebral atrophy  Stable. Monitor.     7. Exudative age-related macular degeneration, right eye, with inactive choroidal neovascularization (HCC)  8. Other secondary cataract, right eye  Managed per ophthalmology.     9. History of basal cell cancer  10. AK (actinic keratosis)  11. Purpura  Referral to dermatology for evaluation and management.     12. Age-related osteoporosis without current pathological fracture  Continue medication for a total of 5 years. Repeat dexa scan in 2026.  - alendronate 70 MG Oral Tab; Take 1 tablet (70 mg total) by mouth once a week.  Dispense: 12 tablet; Refill: 1    13. Acquired hypothyroidism  Recent TSH normal. Continue same dose of medication. Refills given.   - SYNTHROID 112 MCG Oral Tab; Take 1 tablet (112 mcg total) by mouth before breakfast. BRAND ONLY  Dispense: 90 tablet; Refill: 1    14. Generalized anxiety disorder  Controlled. Cpm. Refills given. Follow up in 6 months.   - FLUoxetine HCl 20 MG Oral Tab; Take 1 tablet (20 mg total) by mouth daily.  Dispense: 90 tablet; Refill: 1    15. Recurrent major depressive disorder, in remission  Controlled. Cpm. Refills given.  Follow up in 6 months.   - FLUoxetine HCl 20 MG Oral Tab; Take 1 tablet (20 mg total) by mouth daily.  Dispense: 90 tablet; Refill: 1    16. Osteoarthrosis, localized, primary, knee, right  Stable. Monitor.     17. Chronic bilateral low back pain without sciatica  Stable. Monitor.     18. Non-seasonal allergic rhinitis due to pollen  Stable. Monitor.     19. Visit for screening mammogram  - [facility] SHANNAN 2D+3D SCREENING BILAT (CPT=77067/17055); Future    20. Colon cancer screening  - COLOGUARD COLON CANCER SCREENING (EXTERNAL)    21. Alopecia  - Derm Referral - In Network    22. Difficulty sleeping  Controlled. Cpm. Refills given. Follow up in 6 months.   - traZODone 50 MG Oral Tab; Take 1 tablet (50 mg total) by mouth nightly as needed for Sleep.  Dispense: 90 tablet; Refill: 1    23. Basal ganglia infarction (HCC)  - atorvastatin 80 MG Oral Tab; Take 1 tablet (80 mg total) by mouth nightly.  Dispense: 90 tablet; Refill: 1    24. Hypercholesteremia  Lipids normal. Cpm.   - atorvastatin 80 MG Oral Tab; Take 1 tablet (80 mg total) by mouth nightly.  Dispense: 90 tablet; Refill: 1    25. History of cerebrovascular accident (CVA) with residual deficit  As above.   - clopidogrel 75 MG Oral Tab; Take 1 tablet (75 mg total) by mouth daily.  Dispense: 90 tablet; Refill: 1    26. Rib pain on left side  Check xray to rule out fracture.   - XR RIBS, UNILATERAL (2 VIEWS), LEFT (CPT=71100); Future      The patient indicates understanding of these issues and agrees to the plan.  Reinforced healthy diet, lifestyle, and exercise.      No follow-ups on file.     Sd Martinez PA-C, 4/16/2025     Supplementary Documentation:   General Health:  In the past six months, have you lost more than 10 pounds without trying?: 2 - No  Has your appetite been poor?: No  Type of Diet: Balanced  How does the patient maintain a good energy level?: Stretching, Daily Walks  How would you describe your daily physical activity?: Moderate  How would you  describe your current health state?: Good  How do you maintain positive mental well-being?: Puzzles, Visiting Friends, Visiting Family  On a scale of 0 to 10, with 0 being no pain and 10 being severe pain, what is your pain level?: 0 - (None)  In the past six months, have you experienced urine leakage?: 0-No  At any time do you feel concerned for the safety/well-being of yourself and/or your children, in your home or elsewhere?: No    Health Maintenance   Topic Date Due    Zoster Vaccines (2 of 3) 2013    Colorectal Cancer Screening  08/15/2023    COVID-19 Vaccine ( season) 2024    Annual Well Visit  Never done    Annual Depression Screening  2025    Fall Risk Screening (Annual)  2025    Influenza Vaccine (Season Ended) 10/01/2025    DEXA Scan  Completed    Pneumococcal Vaccine: 50+ Years  Completed    Meningococcal B Vaccine  Aged Out          [1]   Social History  Tobacco Use   Smoking Status Former    Current packs/day: 0.00    Average packs/day: 1 pack/day for 20.0 years (20.0 ttl pk-yrs)    Types: Cigarettes    Start date: 1992    Quit date: 2012    Years since quittin.2   Smokeless Tobacco Never

## 2025-04-16 NOTE — PROGRESS NOTES
The following individual(s) verbally consented to be recorded using ambient AI listening technology and understand that they can each withdraw their consent to this listening technology at any point by asking the clinician to turn off or pause the recording:    Patient name: Agustin Hall Marshal  Additional names:

## 2025-04-22 ENCOUNTER — HOSPITAL ENCOUNTER (OUTPATIENT)
Dept: GENERAL RADIOLOGY | Age: 80
Discharge: HOME | End: 2025-04-22
Attending: PHYSICIAN ASSISTANT
Payer: MEDICARE

## 2025-04-22 DIAGNOSIS — R07.81 RIB PAIN ON LEFT SIDE: ICD-10-CM

## 2025-04-22 PROCEDURE — 71101 X-RAY EXAM UNILAT RIBS/CHEST: CPT | Performed by: PHYSICIAN ASSISTANT

## 2025-04-28 ENCOUNTER — LAB ENCOUNTER (OUTPATIENT)
Dept: LAB | Age: 80
End: 2025-04-28
Attending: PHYSICIAN ASSISTANT
Payer: MEDICARE

## 2025-04-28 DIAGNOSIS — L65.9 ALOPECIA, UNSPECIFIED: Primary | ICD-10-CM

## 2025-04-28 LAB
DEPRECATED HBV CORE AB SER IA-ACNC: 35 NG/ML (ref 50–306)
TSI SER-ACNC: 0.84 UIU/ML (ref 0.55–4.78)
VIT D+METAB SERPL-MCNC: 89.2 NG/ML (ref 30–100)

## 2025-04-28 PROCEDURE — 86039 ANTINUCLEAR ANTIBODIES (ANA): CPT

## 2025-04-28 PROCEDURE — 86225 DNA ANTIBODY NATIVE: CPT

## 2025-04-28 PROCEDURE — 86038 ANTINUCLEAR ANTIBODIES: CPT

## 2025-04-28 PROCEDURE — 86235 NUCLEAR ANTIGEN ANTIBODY: CPT

## 2025-04-28 PROCEDURE — 36415 COLL VENOUS BLD VENIPUNCTURE: CPT

## 2025-04-28 PROCEDURE — 84443 ASSAY THYROID STIM HORMONE: CPT

## 2025-04-28 PROCEDURE — 82728 ASSAY OF FERRITIN: CPT

## 2025-04-28 PROCEDURE — 82306 VITAMIN D 25 HYDROXY: CPT

## 2025-04-28 PROCEDURE — 84630 ASSAY OF ZINC: CPT

## 2025-05-01 LAB — ZINC: 120 UG/DL

## 2025-05-02 LAB
ANA NUCLEOLAR TITR SER IF: 320 {TITER}
DSDNA AB TITR SER: <10 {TITER}
NUCLEAR IGG TITR SER IF: POSITIVE {TITER}

## 2025-05-09 LAB
CENTROMERE IGG SER-ACNC: <0.4 U/ML (ref ?–7)
ENA JO1 AB SER IA-ACNC: <0.3 U/ML (ref ?–7)
ENA RNP IGG SER IA-ACNC: 1.9 U/ML (ref ?–7)
ENA SCL70 IGG SER IA-ACNC: 1 U/ML (ref ?–7)
ENA SM IGG SER IA-ACNC: 1.1 U/ML (ref ?–7)
ENA SS-A IGG SER IA-ACNC: 0.6 U/ML (ref ?–7)
ENA SS-B IGG SER IA-ACNC: <0.4 U/ML (ref ?–7)
U1 SNRNP IGG SER IA-ACNC: 2.4 U/ML (ref ?–5)

## 2025-07-11 RX ORDER — HYDRALAZINE HYDROCHLORIDE 10 MG/1
TABLET, FILM COATED ORAL
Qty: 270 TABLET | Refills: 0 | Status: SHIPPED | OUTPATIENT
Start: 2025-07-11

## 2025-07-11 NOTE — TELEPHONE ENCOUNTER
A refill request was received for:  Requested Prescriptions     Pending Prescriptions Disp Refills    HYDRALAZINE 10 MG Oral Tab [Pharmacy Med Name: Hydralazine Hydrochloride 10 Mg Tab Avet] 270 tablet 0     Sig: TAKE ONE TABLET AT NOON AND TWO TABLETS AT 6 PM SCHEDULED.  (TAKE IN EVENINGS YOU DO NOT HAVE A GLASS OF WINE) MAY TAKE A SECOND EVENING DOSE IF SYSTOLIC BP IS OVER 140       Last refill date: 03/09/22      Last office visit:04/16/25       Future Appointments   Date Time Provider Department Center   8/20/2025  1:00 PM Annabelle Houston MD [id number] [address]

## 2025-08-20 ENCOUNTER — LAB ENCOUNTER (OUTPATIENT)
Dept: LAB | Age: 80
End: 2025-08-20
Attending: INTERNAL MEDICINE

## 2025-08-20 ENCOUNTER — OFFICE VISIT (OUTPATIENT)
Dept: RHEUMATOLOGY | Facility: CLINIC | Age: 80
End: 2025-08-20

## 2025-08-20 VITALS
BODY MASS INDEX: 22.88 KG/M2 | HEIGHT: 64 IN | DIASTOLIC BLOOD PRESSURE: 66 MMHG | SYSTOLIC BLOOD PRESSURE: 110 MMHG | HEART RATE: 68 BPM | RESPIRATION RATE: 16 BRPM | OXYGEN SATURATION: 97 % | WEIGHT: 134 LBS | TEMPERATURE: 98 F

## 2025-08-20 DIAGNOSIS — M81.0 AGE-RELATED OSTEOPOROSIS WITHOUT CURRENT PATHOLOGICAL FRACTURE: ICD-10-CM

## 2025-08-20 DIAGNOSIS — Z87.891 HISTORY OF SMOKING 10-25 PACK YEARS: ICD-10-CM

## 2025-08-20 DIAGNOSIS — M81.0 AGE-RELATED OSTEOPOROSIS WITHOUT CURRENT PATHOLOGICAL FRACTURE: Primary | ICD-10-CM

## 2025-08-20 DIAGNOSIS — E06.3 HASHIMOTO'S THYROIDITIS: ICD-10-CM

## 2025-08-20 DIAGNOSIS — F41.1 GENERALIZED ANXIETY DISORDER: ICD-10-CM

## 2025-08-20 DIAGNOSIS — L65.9 ALOPECIA: ICD-10-CM

## 2025-08-20 DIAGNOSIS — R76.8 ANA POSITIVE: ICD-10-CM

## 2025-08-20 DIAGNOSIS — M15.0 PRIMARY OSTEOARTHRITIS INVOLVING MULTIPLE JOINTS: ICD-10-CM

## 2025-08-20 LAB
ALBUMIN SERPL-MCNC: 4.4 G/DL (ref 3.2–4.8)
ALBUMIN/GLOB SERPL: 1.6 (ref 1–2)
ALP LIVER SERPL-CCNC: 88 U/L (ref 55–142)
ALT SERPL-CCNC: 38 U/L (ref 10–49)
ANION GAP SERPL CALC-SCNC: 10 MMOL/L (ref 0–18)
AST SERPL-CCNC: 49 U/L (ref ?–34)
BASOPHILS # BLD AUTO: 0.02 X10(3) UL (ref 0–0.2)
BASOPHILS NFR BLD AUTO: 0.3 %
BILIRUB SERPL-MCNC: 0.3 MG/DL (ref 0.2–1.1)
BUN BLD-MCNC: 13 MG/DL (ref 9–23)
C3 SERPL-MCNC: 142.8 MG/DL (ref 90–170)
C4 SERPL-MCNC: 32 MG/DL (ref 12–36)
CALCIUM BLD-MCNC: 9.7 MG/DL (ref 8.7–10.6)
CALCIUM BLD-MCNC: 9.9 MG/DL (ref 8.7–10.6)
CHLORIDE SERPL-SCNC: 104 MMOL/L (ref 98–112)
CO2 SERPL-SCNC: 27 MMOL/L (ref 21–32)
CREAT BLD-MCNC: 0.7 MG/DL (ref 0.55–1.02)
CREAT BLD-MCNC: 0.71 MG/DL (ref 0.55–1.02)
CRP SERPL-MCNC: <0.5 MG/DL (ref ?–0.5)
EGFRCR SERPLBLD CKD-EPI 2021: 85 ML/MIN/1.73M2 (ref 60–?)
EOSINOPHIL # BLD AUTO: 0.06 X10(3) UL (ref 0–0.7)
EOSINOPHIL NFR BLD AUTO: 0.9 %
ERYTHROCYTE [DISTWIDTH] IN BLOOD BY AUTOMATED COUNT: 14 %
ERYTHROCYTE [SEDIMENTATION RATE] IN BLOOD: 10 MM/HR (ref 0–30)
FASTING STATUS PATIENT QL REPORTED: NO
GLOBULIN PLAS-MCNC: 2.7 G/DL (ref 2–3.5)
GLUCOSE BLD-MCNC: 98 MG/DL (ref 70–99)
HCT VFR BLD AUTO: 36 % (ref 35–48)
HGB BLD-MCNC: 11.9 G/DL (ref 12–16)
IGA SERPL-MCNC: 137.7 MG/DL (ref 40–350)
IGM SERPL-MCNC: 212.3 MG/DL (ref 50–300)
IMM GRANULOCYTES # BLD AUTO: 0.04 X10(3) UL (ref 0–1)
IMM GRANULOCYTES NFR BLD: 0.6 %
IMMUNOGLOBULIN PNL SER-MCNC: 937 MG/DL (ref 650–1600)
LYMPHOCYTES # BLD AUTO: 1.78 X10(3) UL (ref 1–4)
LYMPHOCYTES NFR BLD AUTO: 25.9 %
MCH RBC QN AUTO: 34.2 PG (ref 26–34)
MCHC RBC AUTO-ENTMCNC: 33.1 G/DL (ref 31–37)
MCV RBC AUTO: 103.4 FL (ref 80–100)
MONOCYTES # BLD AUTO: 0.79 X10(3) UL (ref 0.1–1)
MONOCYTES NFR BLD AUTO: 11.5 %
NEUTROPHILS # BLD AUTO: 4.18 X10 (3) UL (ref 1.5–7.7)
NEUTROPHILS # BLD AUTO: 4.18 X10(3) UL (ref 1.5–7.7)
NEUTROPHILS NFR BLD AUTO: 60.8 %
OSMOLALITY SERPL CALC.SUM OF ELEC: 292 MOSM/KG (ref 275–295)
PHOSPHATE SERPL-MCNC: 3.5 MG/DL (ref 2.4–5.1)
PLATELET # BLD AUTO: 239 10(3)UL (ref 150–450)
POTASSIUM SERPL-SCNC: 4.5 MMOL/L (ref 3.5–5.1)
PROT SERPL-MCNC: 7.1 G/DL (ref 5.7–8.2)
PTH-INTACT SERPL-MCNC: 69.5 PG/ML (ref 18.5–88)
RBC # BLD AUTO: 3.48 X10(6)UL (ref 3.8–5.3)
RHEUMATOID FACT SERPL-ACNC: <3.5 IU/ML (ref ?–14)
SODIUM SERPL-SCNC: 141 MMOL/L (ref 136–145)
THYROGLOB SERPL-MCNC: 36 U/ML (ref ?–60)
THYROPEROXIDASE AB SERPL-ACNC: <28 U/ML (ref ?–60)
URATE SERPL-MCNC: 2.5 MG/DL (ref 3.1–7.8)
WBC # BLD AUTO: 6.9 X10(3) UL (ref 4–11)

## 2025-08-20 PROCEDURE — 86334 IMMUNOFIX E-PHORESIS SERUM: CPT

## 2025-08-20 PROCEDURE — 86225 DNA ANTIBODY NATIVE: CPT

## 2025-08-20 PROCEDURE — 86376 MICROSOMAL ANTIBODY EACH: CPT

## 2025-08-20 PROCEDURE — 83970 ASSAY OF PARATHORMONE: CPT

## 2025-08-20 PROCEDURE — 1159F MED LIST DOCD IN RCRD: CPT | Performed by: INTERNAL MEDICINE

## 2025-08-20 PROCEDURE — 86200 CCP ANTIBODY: CPT | Performed by: INTERNAL MEDICINE

## 2025-08-20 PROCEDURE — 3078F DIAST BP <80 MM HG: CPT | Performed by: INTERNAL MEDICINE

## 2025-08-20 PROCEDURE — 99205 OFFICE O/P NEW HI 60 MIN: CPT | Performed by: INTERNAL MEDICINE

## 2025-08-20 PROCEDURE — 86800 THYROGLOBULIN ANTIBODY: CPT

## 2025-08-20 PROCEDURE — 80053 COMPREHEN METABOLIC PANEL: CPT

## 2025-08-20 PROCEDURE — 1160F RVW MEDS BY RX/DR IN RCRD: CPT | Performed by: INTERNAL MEDICINE

## 2025-08-20 PROCEDURE — 85652 RBC SED RATE AUTOMATED: CPT

## 2025-08-20 PROCEDURE — 82310 ASSAY OF CALCIUM: CPT

## 2025-08-20 PROCEDURE — 36415 COLL VENOUS BLD VENIPUNCTURE: CPT

## 2025-08-20 PROCEDURE — 86140 C-REACTIVE PROTEIN: CPT

## 2025-08-20 PROCEDURE — 86431 RHEUMATOID FACTOR QUANT: CPT

## 2025-08-20 PROCEDURE — 86160 COMPLEMENT ANTIGEN: CPT

## 2025-08-20 PROCEDURE — 82784 ASSAY IGA/IGD/IGG/IGM EACH: CPT

## 2025-08-20 PROCEDURE — 85025 COMPLETE CBC W/AUTO DIFF WBC: CPT

## 2025-08-20 PROCEDURE — 3074F SYST BP LT 130 MM HG: CPT | Performed by: INTERNAL MEDICINE

## 2025-08-20 PROCEDURE — 84100 ASSAY OF PHOSPHORUS: CPT

## 2025-08-20 PROCEDURE — 84550 ASSAY OF BLOOD/URIC ACID: CPT

## 2025-08-20 PROCEDURE — 3008F BODY MASS INDEX DOCD: CPT | Performed by: INTERNAL MEDICINE

## 2025-08-20 PROCEDURE — 82565 ASSAY OF CREATININE: CPT

## 2025-08-20 PROCEDURE — 84165 PROTEIN E-PHORESIS SERUM: CPT

## 2025-08-20 PROCEDURE — 83521 IG LIGHT CHAINS FREE EACH: CPT

## 2025-08-21 LAB
ALBUMIN SERPL ELPH-MCNC: 4.19 G/DL (ref 3.75–5.21)
ALBUMIN/GLOB SERPL: 1.6 (ref 1–2)
ALPHA1 GLOB SERPL ELPH-MCNC: 0.27 G/DL (ref 0.19–0.46)
ALPHA2 GLOB SERPL ELPH-MCNC: 0.71 G/DL (ref 0.48–1.05)
B-GLOBULIN SERPL ELPH-MCNC: 0.73 G/DL (ref 0.68–1.23)
CCP IGG SERPL-ACNC: 1.3 U/ML (ref 0–6.9)
DSDNA IGG SERPL IA-ACNC: 3.5 IU/ML (ref ?–10)
GAMMA GLOB SERPL ELPH-MCNC: 0.91 G/DL (ref 0.62–1.7)
KAPPA LC FREE SER-MCNC: 2.73 MG/DL (ref 0.33–1.94)
KAPPA LC FREE/LAMBDA FREE SER NEPH: 1.41 (ref 0.26–1.65)
LAMBDA LC FREE SERPL-MCNC: 1.94 MG/DL (ref 0.57–2.63)
PROT SERPL-MCNC: 6.8 G/DL (ref 5.7–8.2)